# Patient Record
Sex: FEMALE | Race: BLACK OR AFRICAN AMERICAN | NOT HISPANIC OR LATINO | Employment: UNEMPLOYED | URBAN - METROPOLITAN AREA
[De-identification: names, ages, dates, MRNs, and addresses within clinical notes are randomized per-mention and may not be internally consistent; named-entity substitution may affect disease eponyms.]

---

## 2019-04-25 ENCOUNTER — DOCUMENTATION ONLY (OUTPATIENT)
Dept: CARE COORDINATION | Facility: CLINIC | Age: 54
End: 2019-04-25

## 2019-04-26 ENCOUNTER — HOSPITAL ENCOUNTER (OUTPATIENT)
Dept: ULTRASOUND IMAGING | Facility: CLINIC | Age: 54
Discharge: HOME | End: 2019-04-26
Attending: FAMILY MEDICINE | Admitting: FAMILY MEDICINE
Payer: COMMERCIAL

## 2019-04-26 DIAGNOSIS — M79.672 PAIN OF LEFT FOOT: ICD-10-CM

## 2019-04-26 DIAGNOSIS — M79.605 PAIN OF LEFT LOWER EXTREMITY: ICD-10-CM

## 2019-04-26 PROCEDURE — 93971 EXTREMITY STUDY: CPT | Mod: LT

## 2019-05-06 NOTE — TELEPHONE ENCOUNTER
RECORDS RECEIVED FROM: Consult for Left Foot Pain/Swelling, US done 4/26/19, per [facility]   DATE RECEIVED: May 15, 2019    NOTES STATUS DETAILS   OFFICE NOTE from referring provider Received Cassie   OFFICE NOTE from other specialist N/A    DISCHARGE SUMMARY from hospital N/A    DISCHARGE REPORT from the ER N/A    OPERATIVE REPORT N/A    MEDICATION LIST Received    IMPLANT RECORD/STICKER N/A    LABS     CBC/DIFF N/A    CULTURES N/A    INJECTIONS DONE IN RADIOLOGY N/A    MRI N/A    ULTRASOUND Internal 4/26/19   CT SCAN N/A    XRAYS (IMAGES & REPORTS) N/A    TUMOR     PATHOLOGY  Slides & report N/A

## 2019-05-15 ENCOUNTER — PRE VISIT (OUTPATIENT)
Dept: ORTHOPEDICS | Facility: CLINIC | Age: 54
End: 2019-05-15

## 2019-05-15 ENCOUNTER — OFFICE VISIT (OUTPATIENT)
Dept: ORTHOPEDICS | Facility: CLINIC | Age: 54
End: 2019-05-15
Payer: COMMERCIAL

## 2019-05-15 DIAGNOSIS — G62.9 PERIPHERAL POLYNEUROPATHY: Primary | ICD-10-CM

## 2019-05-15 DIAGNOSIS — M79.605 LEFT LEG PAIN: ICD-10-CM

## 2019-05-15 DIAGNOSIS — G62.9 PERIPHERAL POLYNEUROPATHY: ICD-10-CM

## 2019-05-15 LAB
HBA1C MFR BLD: 6.2 % (ref 0–5.6)
HIV 1+2 AB+HIV1 P24 AG SERPL QL IA: NONREACTIVE

## 2019-05-15 NOTE — NURSING NOTE
Reason For Visit:   Chief Complaint   Patient presents with     Consult     Pain, left foot. Ultra sound, 4-.      Medication Problem     Patient stated that she has a lot of medicine she takes but does not know the names.        Pain Assessment  Patient Currently in Pain: Yes  Primary Pain Location: Foot(Left)        Allergies   Allergen Reactions     Penicillins Other (See Comments) and Rash           Daja Hood LPN

## 2019-05-15 NOTE — LETTER
"5/15/2019       RE: Nenita Varela  [address]  [facility] [address]     Dear Colleague,    Thank you for referring your patient, Nenita Varela, to the [facility]. Please see a copy of my visit note below.    Chief Complaint:   Chief Complaint   Patient presents with     Consult     Pain, left foot. Ultra sound, 4-.        Subjective: Nenita is a 54 year old female who presents to the clinic today for evaluation of left leg pain. Presents with  today. Patient is a poor historian.    Onset, duration: About 1 year ago. [place] about [place] ago, says that she had a head injury at some point and after coming to the [place] she began to have many systemic symptoms like nausea, dizziness, headaches. This became a throat pain and then migrated to her entire body feeling pain. She is wondering if it is maybe the climate.   Location: Today wants to discuss left leg pain. Occurs to the entire LLE, hip to toes. Cannot sleep on left side because of this.   Assoc. symptoms: Low back pain, pains to entire body. Feet feel like they are burning in the winter.   Quality, severity: Cramping, stiffness, sharp. Burning pain at times. Pain is the worst at night. Pain occurs daily.  Provocative: Walking any length of distance (more than just around the house or store). Sleeping on the left side.   Palliative: Rest, nothing.  Previous Treatments:  \"none\" - though she is supposedly taking gabapentin TID for low back pain with possible radiculopathy according to PCP notes.    ROS: Denies shooting and electrical pains. Denies numbness to feet. Denies swelling, redness, open lesions, rashes. Admits feeling like her joints are dislocated.     No past medical history on file.  No past surgical history on file.  No family history on file.  Social History     Tobacco Use     Smoking status: Not on file   Substance Use Topics     Alcohol use: " Not on file     Allergies   Allergen Reactions     Penicillins Other (See Comments) and Rash     Current Outpatient Rx   Medication Sig Dispense Refill     ibuprofen (ADVIL,MOTRIN) 600 MG tablet Take 1 tablet (600 mg) by mouth every 6 hours as needed for moderate pain 20 tablet 0       Objective:   There were no vitals taken for this visit.    General: Patient is well groomed, appears stated age, is alert, cooperative and in no acute distress.  Vascular: DP and PT pulses are 2/4 bilaterally. LE capillary refill time is <3 seconds. Normal pedal hair. No LE edema.  MSK: No pain with active or passive ROM of the ankle, MTJ, 1st ray or halluces bilaterally. Equinus absent bilaterally. Nonspecific tenderness to palpation of gastroc/soleus.  Neurologic: Pinpoint sensation decreased to feet bilaterally with 10 gram monofilament.  Skin: LE skin color, texture and turgor are normal. Toenails are normal bilaterally. No open lesions or rashes.     Previous Laboratory Studies:   Lab Results   Component Value Date    A1C 6.2 05/15/2019       US Doppler LE 4/26/19  IMPRESSION:.  No deep vein thrombosis in the left lower extremity.     I have personally reviewed the examination and initial interpretation  and I agree with the findings.     KEENAN PARDO MD    Assessment:   - Left leg pain: most likely diagnosis at this time includes peripheral neuropathy as she did not feel all points with monofilament bilaterally. I believe the pain she is feeling is multifactorial, including low back disease and possibly also her emotional trauma before and during her immigration to the [place]. During the encounter, she continued to mention her immigration and how since then she has had pain throughout her entire body.     Plan:   - Pt seen and evaluated. Diagnosis and treatment options were discussed.   - Recommend further evaluation of pain in combination with absent to decreased sensation via monofilament exam with neurology. Referral placed.  Note that she is prescribed gabapentin 300 mg TID for low back pain with possible radiculopathy.  - Ordered blood work to evaluate for causes of neuropathy like diabetes, HIV and vitamin B12 deficiency. Her TSH was <0.4 on 4/9 and she was prescribed levothyroxine 88 mcg QD on 4/24.  - She went to 2 PT sessions and did not tolerate it so stopped going. Recommend trying again with massage, strengthening.  - Pt to return to clinic as needed    Answers for HPI/ROS submitted by the patient on 5/15/2019   General Symptoms: No  Skin Symptoms: No  HENT Symptoms: No  EYE SYMPTOMS: No  HEART SYMPTOMS: No  LUNG SYMPTOMS: No  INTESTINAL SYMPTOMS: No  URINARY SYMPTOMS: No  GYNECOLOGIC SYMPTOMS: No  BREAST SYMPTOMS: No  SKELETAL SYMPTOMS: No  BLOOD SYMPTOMS: No  NERVOUS SYSTEM SYMPTOMS: No  MENTAL HEALTH SYMPTOMS: No      Again, thank you for allowing me to participate in the care of your patient.      Sincerely,    Deborah Noel PA-C

## 2019-05-15 NOTE — PROGRESS NOTES
"Chief Complaint:   Chief Complaint   Patient presents with     Consult     Pain, left foot. Ultra sound, 4-.        Subjective: Nenita is a 54 year old female who presents to the clinic today for evaluation of left leg pain. Presents with  today. Patient is a poor historian.    Onset, duration: About 1 year ago. [place] about [place] ago, says that she had a head injury at some point and after coming to the [place] she began to have many systemic symptoms like nausea, dizziness, headaches. This became a throat pain and then migrated to her entire body feeling pain. She is wondering if it is maybe the climate.   Location: Today wants to discuss left leg pain. Occurs to the entire LLE, hip to toes. Cannot sleep on left side because of this.   Assoc. symptoms: Low back pain, pains to entire body. Feet feel like they are burning in the winter.   Quality, severity: Cramping, stiffness, sharp. Burning pain at times. Pain is the worst at night. Pain occurs daily.  Provocative: Walking any length of distance (more than just around the house or store). Sleeping on the left side.   Palliative: Rest, nothing.  Previous Treatments:  \"none\" - though she is supposedly taking gabapentin TID for low back pain with possible radiculopathy according to PCP notes.    ROS: Denies shooting and electrical pains. Denies numbness to feet. Denies swelling, redness, open lesions, rashes. Admits feeling like her joints are dislocated.     No past medical history on file.  No past surgical history on file.  No family history on file.  Social History     Tobacco Use     Smoking status: Not on file   Substance Use Topics     Alcohol use: Not on file     Allergies   Allergen Reactions     Penicillins Other (See Comments) and Rash     Current Outpatient Rx   Medication Sig Dispense Refill     ibuprofen (ADVIL,MOTRIN) 600 MG tablet Take 1 tablet (600 mg) by mouth every 6 hours as needed for moderate pain 20 tablet 0 "       Objective:   There were no vitals taken for this visit.    General: Patient is well groomed, appears stated age, is alert, cooperative and in no acute distress.  Vascular: DP and PT pulses are 2/4 bilaterally. LE capillary refill time is <3 seconds. Normal pedal hair. No LE edema.  MSK: No pain with active or passive ROM of the ankle, MTJ, 1st ray or halluces bilaterally. Equinus absent bilaterally. Nonspecific tenderness to palpation of gastroc/soleus.  Neurologic: Pinpoint sensation decreased to feet bilaterally with 10 gram monofilament.  Skin: LE skin color, texture and turgor are normal. Toenails are normal bilaterally. No open lesions or rashes.     Previous Laboratory Studies:   Lab Results   Component Value Date    A1C 6.2 05/15/2019       US Doppler LE 4/26/19  IMPRESSION:.  No deep vein thrombosis in the left lower extremity.     I have personally reviewed the examination and initial interpretation  and I agree with the findings.     KEENAN PARDO MD    Assessment:   - Left leg pain: most likely diagnosis at this time includes peripheral neuropathy as she did not feel all points with monofilament bilaterally. I believe the pain she is feeling is multifactorial, including low back disease and possibly also her emotional trauma before and during her immigration to the [place]. During the encounter, she continued to mention her immigration and how since then she has had pain throughout her entire body.     Plan:   - Pt seen and evaluated. Diagnosis and treatment options were discussed.   - Recommend further evaluation of pain in combination with absent to decreased sensation via monofilament exam with neurology. Referral placed. Note that she is prescribed gabapentin 300 mg TID for low back pain with possible radiculopathy.  - Ordered blood work to evaluate for causes of neuropathy like diabetes, HIV and vitamin B12 deficiency. Her TSH was <0.4 on 4/9 and she was prescribed levothyroxine 88 mcg QD on  4/24.  - She went to 2 PT sessions and did not tolerate it so stopped going. Recommend trying again with massage, strengthening.  - Pt to return to clinic as needed    Answers for HPI/ROS submitted by the patient on 5/15/2019   General Symptoms: No  Skin Symptoms: No  HENT Symptoms: No  EYE SYMPTOMS: No  HEART SYMPTOMS: No  LUNG SYMPTOMS: No  INTESTINAL SYMPTOMS: No  URINARY SYMPTOMS: No  GYNECOLOGIC SYMPTOMS: No  BREAST SYMPTOMS: No  SKELETAL SYMPTOMS: No  BLOOD SYMPTOMS: No  NERVOUS SYSTEM SYMPTOMS: No  MENTAL HEALTH SYMPTOMS: No

## 2019-05-16 LAB — VIT B12 SERPL-MCNC: 438 PG/ML (ref 193–986)

## 2019-08-22 ENCOUNTER — HOSPITAL ENCOUNTER (EMERGENCY)
Facility: CLINIC | Age: 54
End: 2019-08-22
Payer: COMMERCIAL

## 2019-08-22 ENCOUNTER — HOSPITAL ENCOUNTER (OUTPATIENT)
Dept: CARDIOLOGY | Facility: CLINIC | Age: 54
Discharge: HOME | End: 2019-08-22
Attending: INTERNAL MEDICINE | Admitting: INTERNAL MEDICINE
Payer: COMMERCIAL

## 2019-08-22 DIAGNOSIS — R06.09 DOE (DYSPNEA ON EXERTION): ICD-10-CM

## 2019-08-22 DIAGNOSIS — R06.02 SHORTNESS OF BREATH: ICD-10-CM

## 2019-08-22 DIAGNOSIS — R07.9 CHEST PAIN: ICD-10-CM

## 2019-08-22 PROCEDURE — 25500064 ZZH RX 255 OP 636: Performed by: INTERNAL MEDICINE

## 2019-08-22 PROCEDURE — 93350 STRESS TTE ONLY: CPT | Mod: 26 | Performed by: INTERNAL MEDICINE

## 2019-08-22 PROCEDURE — 93325 DOPPLER ECHO COLOR FLOW MAPG: CPT | Mod: 26 | Performed by: INTERNAL MEDICINE

## 2019-08-22 PROCEDURE — 25000125 ZZHC RX 250: Performed by: INTERNAL MEDICINE

## 2019-08-22 PROCEDURE — 25000128 H RX IP 250 OP 636: Performed by: INTERNAL MEDICINE

## 2019-08-22 PROCEDURE — 93321 DOPPLER ECHO F-UP/LMTD STD: CPT | Mod: 26 | Performed by: INTERNAL MEDICINE

## 2019-08-22 PROCEDURE — 40000264 ECHO STRESS ECHOCARDIOGRAM

## 2019-08-22 PROCEDURE — 93018 CV STRESS TEST I&R ONLY: CPT | Performed by: INTERNAL MEDICINE

## 2019-08-22 PROCEDURE — T1013 SIGN LANG/ORAL INTERPRETER: HCPCS | Mod: U3

## 2019-08-22 PROCEDURE — 93016 CV STRESS TEST SUPVJ ONLY: CPT | Performed by: INTERNAL MEDICINE

## 2019-08-22 RX ORDER — DOBUTAMINE HYDROCHLORIDE 200 MG/100ML
10-50 INJECTION INTRAVENOUS CONTINUOUS
Status: SHIPPED | OUTPATIENT
Start: 2019-08-22 | End: 2019-08-22

## 2019-08-22 RX ORDER — METOPROLOL TARTRATE 1 MG/ML
1-20 INJECTION, SOLUTION INTRAVENOUS
Status: ACTIVE | OUTPATIENT
Start: 2019-08-22 | End: 2019-08-22

## 2019-08-22 RX ORDER — ATROPINE SULFATE 0.4 MG/ML
.2-2 AMPUL (ML) INJECTION
Status: COMPLETED | OUTPATIENT
Start: 2019-08-22 | End: 2019-08-22

## 2019-08-22 RX ADMIN — DOBUTAMINE HYDROCHLORIDE 10 MCG/KG/MIN: 200 INJECTION INTRAVENOUS at 10:37

## 2019-08-22 RX ADMIN — METOPROLOL TARTRATE 2 MG: 1 INJECTION, SOLUTION INTRAVENOUS at 10:51

## 2019-08-22 RX ADMIN — ATROPINE SULFATE 0.2 MG: 0.4 INJECTION, SOLUTION INTRAMUSCULAR; INTRAVENOUS; SUBCUTANEOUS at 10:45

## 2019-08-22 RX ADMIN — PERFLUTREN 5 ML: 6.52 INJECTION, SUSPENSION INTRAVENOUS at 10:50

## 2019-08-22 NOTE — PROGRESS NOTES
Pt here for dobutamine stress test.  Test, meds and side effects reviewed with patient.  Achieved target HR at 30 mcg Dobutamine and a total of 0.2 mg IV atropine.  Gave a total of 2 mg IV Metoprolol to bring HR back to baseline.  Pt did complain of 5/10 chest pain when we reached target HR. Medication was stopped. Chest pain resolved. MD stated ok to go home. Post monitoring complete and VSS.  Pt escorted out to the gold waiting room.

## 2020-04-14 ENCOUNTER — HOSPITAL ENCOUNTER (EMERGENCY)
Facility: CLINIC | Age: 55
Discharge: HOME | End: 2020-04-14
Attending: PHYSICIAN ASSISTANT | Admitting: PHYSICIAN ASSISTANT
Payer: COMMERCIAL

## 2020-04-14 ENCOUNTER — APPOINTMENT (OUTPATIENT)
Dept: CT IMAGING | Facility: CLINIC | Age: 55
End: 2020-04-14
Attending: PHYSICIAN ASSISTANT
Payer: COMMERCIAL

## 2020-04-14 ENCOUNTER — APPOINTMENT (OUTPATIENT)
Dept: GENERAL RADIOLOGY | Facility: CLINIC | Age: 55
End: 2020-04-14
Attending: PHYSICIAN ASSISTANT
Payer: COMMERCIAL

## 2020-04-14 VITALS
DIASTOLIC BLOOD PRESSURE: 78 MMHG | OXYGEN SATURATION: 97 % | SYSTOLIC BLOOD PRESSURE: 153 MMHG | TEMPERATURE: 98 F | RESPIRATION RATE: 16 BRPM | HEART RATE: 91 BPM

## 2020-04-14 DIAGNOSIS — K85.90 ACUTE PANCREATITIS, UNSPECIFIED COMPLICATION STATUS, UNSPECIFIED PANCREATITIS TYPE: ICD-10-CM

## 2020-04-14 DIAGNOSIS — R10.13 ABDOMINAL PAIN, EPIGASTRIC: ICD-10-CM

## 2020-04-14 DIAGNOSIS — R11.0 NAUSEA: ICD-10-CM

## 2020-04-14 DIAGNOSIS — R06.02 SHORTNESS OF BREATH: ICD-10-CM

## 2020-04-14 LAB
ALBUMIN SERPL-MCNC: 3.8 G/DL (ref 3.4–5)
ALBUMIN UR-MCNC: NEGATIVE MG/DL
ALP SERPL-CCNC: 110 U/L (ref 40–150)
ALT SERPL W P-5'-P-CCNC: 26 U/L (ref 0–50)
ANION GAP SERPL CALCULATED.3IONS-SCNC: 4 MMOL/L (ref 3–14)
APPEARANCE UR: CLEAR
AST SERPL W P-5'-P-CCNC: 17 U/L (ref 0–45)
BASOPHILS # BLD AUTO: 0 10E9/L (ref 0–0.2)
BASOPHILS NFR BLD AUTO: 0.3 %
BILIRUB SERPL-MCNC: 0.3 MG/DL (ref 0.2–1.3)
BILIRUB UR QL STRIP: NEGATIVE
BUN SERPL-MCNC: 14 MG/DL (ref 7–30)
CALCIUM SERPL-MCNC: 8.8 MG/DL (ref 8.5–10.1)
CHLORIDE SERPL-SCNC: 98 MMOL/L (ref 94–109)
CO2 SERPL-SCNC: 27 MMOL/L (ref 20–32)
COLOR UR AUTO: ABNORMAL
CREAT SERPL-MCNC: 0.64 MG/DL (ref 0.52–1.04)
DIFFERENTIAL METHOD BLD: NORMAL
EOSINOPHIL # BLD AUTO: 0 10E9/L (ref 0–0.7)
EOSINOPHIL NFR BLD AUTO: 0.6 %
ERYTHROCYTE [DISTWIDTH] IN BLOOD BY AUTOMATED COUNT: 11.9 % (ref 10–15)
GFR SERPL CREATININE-BSD FRML MDRD: >90 ML/MIN/{1.73_M2}
GLUCOSE SERPL-MCNC: 231 MG/DL (ref 70–99)
GLUCOSE UR STRIP-MCNC: 70 MG/DL
HCG SERPL QL: NEGATIVE
HCT VFR BLD AUTO: 42.1 % (ref 35–47)
HGB BLD-MCNC: 14.1 G/DL (ref 11.7–15.7)
HGB UR QL STRIP: NEGATIVE
IMM GRANULOCYTES # BLD: 0 10E9/L (ref 0–0.4)
IMM GRANULOCYTES NFR BLD: 0.4 %
KETONES UR STRIP-MCNC: NEGATIVE MG/DL
LEUKOCYTE ESTERASE UR QL STRIP: NEGATIVE
LIPASE SERPL-CCNC: 604 U/L (ref 73–393)
LYMPHOCYTES # BLD AUTO: 1.6 10E9/L (ref 0.8–5.3)
LYMPHOCYTES NFR BLD AUTO: 23.4 %
MCH RBC QN AUTO: 28.7 PG (ref 26.5–33)
MCHC RBC AUTO-ENTMCNC: 33.5 G/DL (ref 31.5–36.5)
MCV RBC AUTO: 86 FL (ref 78–100)
MONOCYTES # BLD AUTO: 0.5 10E9/L (ref 0–1.3)
MONOCYTES NFR BLD AUTO: 6.5 %
NEUTROPHILS # BLD AUTO: 4.8 10E9/L (ref 1.6–8.3)
NEUTROPHILS NFR BLD AUTO: 68.8 %
NITRATE UR QL: NEGATIVE
NRBC # BLD AUTO: 0 10*3/UL
NRBC BLD AUTO-RTO: 0 /100
PH UR STRIP: 7 PH (ref 5–7)
PLATELET # BLD AUTO: 231 10E9/L (ref 150–450)
POTASSIUM SERPL-SCNC: 4 MMOL/L (ref 3.4–5.3)
PROT SERPL-MCNC: 7.8 G/DL (ref 6.8–8.8)
RBC # BLD AUTO: 4.91 10E12/L (ref 3.8–5.2)
RBC #/AREA URNS AUTO: 0 /HPF (ref 0–2)
SODIUM SERPL-SCNC: 129 MMOL/L (ref 133–144)
SOURCE: ABNORMAL
SP GR UR STRIP: 1 (ref 1–1.03)
TROPONIN I SERPL-MCNC: <0.015 UG/L (ref 0–0.04)
UROBILINOGEN UR STRIP-MCNC: NORMAL MG/DL (ref 0–2)
WBC # BLD AUTO: 6.9 10E9/L (ref 4–11)
WBC #/AREA URNS AUTO: <1 /HPF (ref 0–5)

## 2020-04-14 PROCEDURE — 85025 COMPLETE CBC W/AUTO DIFF WBC: CPT | Performed by: PHYSICIAN ASSISTANT

## 2020-04-14 PROCEDURE — 71046 X-RAY EXAM CHEST 2 VIEWS: CPT

## 2020-04-14 PROCEDURE — 96361 HYDRATE IV INFUSION ADD-ON: CPT

## 2020-04-14 PROCEDURE — 25000128 H RX IP 250 OP 636: Performed by: PHYSICIAN ASSISTANT

## 2020-04-14 PROCEDURE — 93005 ELECTROCARDIOGRAM TRACING: CPT

## 2020-04-14 PROCEDURE — 84703 CHORIONIC GONADOTROPIN ASSAY: CPT | Performed by: PHYSICIAN ASSISTANT

## 2020-04-14 PROCEDURE — 83690 ASSAY OF LIPASE: CPT | Performed by: PHYSICIAN ASSISTANT

## 2020-04-14 PROCEDURE — 84484 ASSAY OF TROPONIN QUANT: CPT | Performed by: PHYSICIAN ASSISTANT

## 2020-04-14 PROCEDURE — 25000132 ZZH RX MED GY IP 250 OP 250 PS 637: Performed by: PHYSICIAN ASSISTANT

## 2020-04-14 PROCEDURE — 99285 EMERGENCY DEPT VISIT HI MDM: CPT | Mod: 25

## 2020-04-14 PROCEDURE — 81001 URINALYSIS AUTO W/SCOPE: CPT | Performed by: PHYSICIAN ASSISTANT

## 2020-04-14 PROCEDURE — 25800030 ZZH RX IP 258 OP 636: Performed by: PHYSICIAN ASSISTANT

## 2020-04-14 PROCEDURE — 80053 COMPREHEN METABOLIC PANEL: CPT | Performed by: PHYSICIAN ASSISTANT

## 2020-04-14 PROCEDURE — 25000125 ZZHC RX 250: Performed by: PHYSICIAN ASSISTANT

## 2020-04-14 PROCEDURE — 96374 THER/PROPH/DIAG INJ IV PUSH: CPT | Mod: 59

## 2020-04-14 PROCEDURE — 74177 CT ABD & PELVIS W/CONTRAST: CPT

## 2020-04-14 RX ORDER — ONDANSETRON 2 MG/ML
4 INJECTION INTRAMUSCULAR; INTRAVENOUS EVERY 30 MIN PRN
Status: DISCONTINUED | OUTPATIENT
Start: 2020-04-14 | End: 2020-04-14 | Stop reason: HOSPADM

## 2020-04-14 RX ORDER — SUCRALFATE 1 G/1
1 TABLET ORAL 4 TIMES DAILY
Qty: 16 TABLET | Refills: 0 | Status: SHIPPED | OUTPATIENT
Start: 2020-04-14 | End: 2020-06-12

## 2020-04-14 RX ORDER — IOPAMIDOL 755 MG/ML
100 INJECTION, SOLUTION INTRAVASCULAR ONCE
Status: COMPLETED | OUTPATIENT
Start: 2020-04-14 | End: 2020-04-14

## 2020-04-14 RX ORDER — ONDANSETRON 4 MG/1
4 TABLET, ORALLY DISINTEGRATING ORAL EVERY 8 HOURS PRN
Qty: 10 TABLET | Refills: 0 | Status: SHIPPED | OUTPATIENT
Start: 2020-04-14 | End: 2020-04-17

## 2020-04-14 RX ORDER — OXYCODONE HYDROCHLORIDE 5 MG/1
5 TABLET ORAL EVERY 6 HOURS PRN
Qty: 12 TABLET | Refills: 0 | Status: SHIPPED | OUTPATIENT
Start: 2020-04-14

## 2020-04-14 RX ADMIN — SODIUM CHLORIDE 1000 ML: 9 INJECTION, SOLUTION INTRAVENOUS at 18:04

## 2020-04-14 RX ADMIN — LIDOCAINE HYDROCHLORIDE 30 ML: 20 SOLUTION ORAL; TOPICAL at 18:17

## 2020-04-14 RX ADMIN — IOPAMIDOL 100 ML: 755 INJECTION, SOLUTION INTRAVENOUS at 18:29

## 2020-04-14 RX ADMIN — ONDANSETRON 4 MG: 2 INJECTION INTRAMUSCULAR; INTRAVENOUS at 18:03

## 2020-04-14 ASSESSMENT — ENCOUNTER SYMPTOMS
FEVER: 0
SORE THROAT: 0
DIARRHEA: 0
VOMITING: 0
RHINORRHEA: 0
NAUSEA: 1
ABDOMINAL PAIN: 1
COUGH: 0
BLOOD IN STOOL: 0
SHORTNESS OF BREATH: 1

## 2020-04-14 NOTE — ED PROVIDER NOTES
History     Chief Complaint:  Abdominal Pain    HPI  Jayne Merchant is a 55 year old female the ED for evaluation of abdominal pain.  Patient reports onset of epigastric abdominal pain that began a few days ago. The pain is non-radiating, intermittent, and nothing makes the pain worse or better. The abdominal pain is significantly worse today.  She reports associated midsternal chest pain as well as nausea, mild subjective fever, and mild shortness of breath.    She denies any cough, sore throat, rhinorrhea. She denies any vomiting, diarrhea, or black or bloody stools.     US Abd Complete on 2/3/2020  FINDINGS:  Liver: Normal echotexture is seen throughout the liver. There is no evidence of intrahepatic biliary dilatation.  Gallbladder: There is no evidence of gallstone. Gallbladder wall is normal thickness. Negative sonographic Oliver sign is reported.  Common bile duct: 4 mm.   Pancreas: Visualized portions of the pancreas are normal.  Spleen: Normal.   Right kidney: There is mild prominence of the renal calices. There is no evidence of hydronephrosis or mass.  Left kidney: There is mild prominence of the renal calices. There is no evidence of hydronephrosis or mass.   IMPRESSION:  Normal abdominal ultrasound.    Lipase 25.3 on 1/29/20  Triglycerides 178 on 1/27/20    Allergies:  Statins    Medications:    Prilosec  Singulair  Cymbalta  Nortriptyline  Lyrica  Insulin  Flonase  Zofran    Past Medical History:    Type I diabetes  Fibromyalgia  Lipoma of left upper extremity  Tension headache  HLD  Memory deficit  Degeneration of intervertebral disk   Chronic neck pain  H. Pylori  [place]    Past Surgical History:    C section  Female circumcision    Family History:    History reviewed. No pertinent family history.     Social History:  The patient arrives alone  Smoking: never  Alcohol use: no  Marital Status:  [5]      Review of Systems   Constitutional: Negative for fever.   HENT: Negative for rhinorrhea  and sore throat.    Respiratory: Positive for shortness of breath. Negative for cough.    Cardiovascular: Positive for chest pain.   Gastrointestinal: Positive for abdominal pain and nausea. Negative for blood in stool, diarrhea and vomiting.   All other systems reviewed and are negative.      Physical Exam     Patient Vitals for the past 24 hrs:   BP Temp Temp src Pulse Heart Rate Resp SpO2   04/14/20 1900 -- -- -- -- -- -- 97 %   04/14/20 1800 -- -- -- 91 -- -- 100 %   04/14/20 1734 -- 98  F (36.7  C) Oral -- -- -- --   04/14/20 1733 (!) 153/78 -- -- -- 91 16 100 %     Physical Exam  Constitutional: Pleasant. Cooperative.   Eyes: Pupils equally round and reactive  HENT: Head is normal in appearance. Oropharynx is normal with moist mucus membranes.  Cardiovascular: Regular rate and rhythm and without murmurs.  Respiratory: Normal respiratory effort, lungs are clear bilaterally.  GI: Tenderness around area of umbilicus and epigastrium, otherwise non-tender, soft, non-distended. No guarding, rebound, or rigidity.  Musculoskeletal: No asymmetry of the lower extremities, no tenderness to palpation.  Skin: Normal, without rash.  Neurologic: Cranial nerves grossly intact, normal cognition, no focal deficits. Alert and oriented x 3.   Psychiatric: Normal affect.  Nursing notes and vital signs reviewed.    Emergency Department Course     ECG:  ECG taken at 1849, ECG read at 1851  Normal sinus rhythm  normal ECG  Rate 95 bpm. TN interval 150 ms. QRS duration 84 ms. QT/QTc 364/457 ms. P-R-T axes 74 30 70.    Imaging:  Radiology findings were communicated with the patient who voiced understanding of the findings.    XR Chest 2 views   IMPRESSION: Heart size and pulmonary vascularity normal. The lungs are clear  Reading per radiology    CT Abdomen Pelvis w contrast   IMPRESSION:   1.  Small hepatic cysts and left renal cyst.   2.  Distended bladder.   3.  Exam otherwise unremarkable. No findings to account for the patient's  diffuse abdominal pain  Reading per radiology    Laboratory:  Laboratory findings were communicated with the patient who voiced understanding of the findings.    CBC:  o/w WNL. (WBC 6.9, HGB 14.1, )   CMP: Glucose 231 (H),  (L), o/w WNL (Creatinine: 0.64)    Lipase: 604 (H)    Troponin (Collected 1750): <0.015    UA: urine glucose 70 o/w WNL  Urine culture: Pending    HCG Qualitative Urine: negative    Interventions:  1803 Zofran 4mg IV injection   1804 NS 1L IV Bolus  1817 GI Cocktail - Maalox 15 mL, Viscous Lidocaine 15 mL, 30 mL suspension PO    Emergency Department Course:  Past medical records, nursing notes, and vitals reviewed.  1725: I performed an exam of the patient and obtained history, as documented above.     IV was inserted and blood was drawn for laboratory testing, results above.  The patient was sent for a CT and XR while in the emergency department, findings above  The patient provided a urine sample here in the emergency department. This was sent for laboratory testing, findings above.    1902: I rechecked the patient. Explained findings to patient.    I personally reviewed the laboratory and imaging results with the Patient and answered all related questions prior to discharge.     Findings and plan explained to the Patient. Patient discharged home with instructions regarding supportive care, medications, and reasons to return. The importance of close follow-up was reviewed.   Impression & Plan      Medical Decision Making:  Jayne Merchant is a 55 year old female who presents to the emergency department today for evaluation of chest pain and abdominal pain.  See HPI as above for additional details.  Vitals and physical exam as above.  Differential for abdominal pain was broad and included ACS, gallbladder pathology, GERD, pancreatitis, PUD, among others.  Differential for chest pain included ACS, pneumothorax, GERD, dissection, MSK, among others.  The above work-up was obtained.   Suspect patient's abdominal symptoms are secondary to her mild pancreatitis vs GERD.  This is of unclear etiology at this time.  Patient had a recent ultrasound of her abdomen which revealed no gallstones, see  results as above.  Patient also does not have history of significantly elevated triglycerides as above.  Patient does not consume alcohol.  Etiology of patient's pancreatitis is unclear at this time.  Given mild nature and patient's complete resolution of symptoms following GI cocktail, will discharge patient to home with pain medication, nausea medication, and referral for GI. Chest pain is of unclear etiology at this time.  No evidence for the above etiology with the exception of possibly GERD.  Patient did receive complete resolution of symptoms following GI cocktail. Will have patient do clear liquid diet for the next few days and use prescriptions for her symptoms. She should call her PCP to discuss this visit. Discussed reasons to return. All questions answered. Patient discharged to home in stable condition.    Diagnosis:    ICD-10-CM    1. Abdominal pain, epigastric  R10.13    2. Acute pancreatitis, unspecified complication status, unspecified pancreatitis type  K85.90    3. Nausea  R11.0    4. Shortness of breath  R06.02        Disposition:   discharged to home    Discharge Medications:     Medication List      Started    ondansetron 4 MG ODT tab  Commonly known as:  Zofran ODT  4 mg, Oral, EVERY 8 HOURS PRN     oxyCODONE 5 MG tablet  Commonly known as:  ROXICODONE  5 mg, Oral, EVERY 6 HOURS PRN     sucralfate 1 GM tablet  Commonly known as:  CARAFATE  1 g, Oral, 4 TIMES DAILY            Scribe Disclosure:  Lexie LORENZANA, am serving as a scribe at 5:25 PM on 4/14/2020 to document services personally performed by Ludwig Roque PA-C based on my observations and the provider's statements to me.    [facility] EMERGENCY DEPARTMENT       Ludwig Roque PA-C  04/14/20 1953

## 2020-04-14 NOTE — ED AVS SNAPSHOT
[facility] Emergency Department  [address]  [facility] [address]  Phone:  [phone]  Fax:  [phone]                                    Jayne Merchant   MRN: [id number]    Department:  [facility] Emergency Department   Date of Visit:  4/14/2020           After Visit Summary Signature Page    I have received my discharge instructions, and my questions have been answered. I have discussed any challenges I see with this plan with the nurse or doctor.    ..........................................................................................................................................  Patient/Patient Representative Signature      ..........................................................................................................................................  Patient Representative Print Name and Relationship to Patient    ..................................................               ................................................  Date                                   Time    ..........................................................................................................................................  Reviewed by Signature/Title    ...................................................              ..............................................  Date                                               Time          22EPIC Rev 08/18

## 2020-04-14 NOTE — ED TRIAGE NOTES
"Pt arrives with nausea and stomach. Pt describes pain as \"gastic pain\" that moves into chest. Pt has history of similar symptoms. A&O, VSS.   "

## 2020-04-15 LAB — INTERPRETATION ECG - MUSE: NORMAL

## 2020-04-15 NOTE — DISCHARGE INSTRUCTIONS
For pain, you can take up to 1000 mg or 1 g of Tylenol.  You can take 600 mg of ibuprofen at one time.  You can take these together every 6 hours if needed or alternate every 3 hours.  Always take ibuprofen with food.  Never take more than 4 g (4000 mg) of Tylenol or 3200mg of ibuprofen in one day.  Do not take this amount for more than 1 week at a time.     Drink plenty of fluids.    Consume a clear liquid diet for the next 2-3 days.    Use Oxycodone for breakthrough pain. This medication is sedating. Do not drive after taking it.    Discharge Instructions  COVID-19    Your Provider has determined that you should practice self-isolation and self-monitoring in order to protect yourself and your community from COVID-19, which is the disease caused by a new coronavirus. The virus spreads from person to person primarily by droplets when an infected person coughs or sneezes and the droplet either lands on another person or that other person touches a surface with the droplet on it. Diagnosis of COVID-19 can be made with a test but many times the test is unavailable or not necessary. There is no specific treatment or medicine for the disease.    Symptoms of COVID-19  Many people have no symptoms or mild symptoms.  Symptoms may usually appear 4 to 5 days (up to 14 days) after contact with another ill person. Some people will get severe symptoms and pneumonia. Usual symptoms are:     Fever    Cough   Trouble breathing   Less common symptoms are: Headache, body aches, sore    throat, sneezing, diarrhea.    How to Care for Yourself    Stay home.  Most people will recover from illness with mild symptoms.  Isolation by staying home is the best method to prevent the spread of the illness. Do not go to work or school. Have a friend or relative do your shopping. Do not use public transportation (bus, train) or ridesharing (Lyft, Uber).    How long should I stay home?  If you have symptoms of a respiratory disease (fever, cough),  you should stay home for at least 7 days, and for 3 days with no fever and improvement of respiratory symptoms--whichever is longer. (Your fever should be gone for 3 days without using fever-reducing medicine.)    For example, if you have a fever and coughing for 4 days, you need to stay home 3 more days with no fever for a total of 7 days. Or, if you have a fever and coughing for 5 days, you need to stay home 3 more days with no fever for a total of 8 days.    Separate yourself from other people in your home.?As much as possible, you should stay in one room and away from other people in your home. Also, use a separate bathroom, if possible. Avoid handling pets or other animals while sick.     Wear a facemask if you need to be around other people and cover your mouth and nose with a tissue when you cough or sneeze.     Avoid sharing personal household items. You should not share dishes, drinking glasses, forks/knives/spoons, towels, or bedding with other people in your home. After using these items, they should be washed with soap and water. Clean parts of your home that are touched often (doorknobs, faucets, countertops, etc.) daily.     Wash your hands often with soap and water for at least 20 seconds or use an alcohol-based hand  containing at least 60% alcohol.     Avoid touching your face.    Treat your symptoms: Take Acetaminophen (Tylenol) to treat body aches and fever as needed for comfort. Ibuprofen (Advil or Motrin) can be used as well if you still have symptoms after taking Tylenol.  Drink fluids. Rest.    Watch for worsening symptoms, shortness of breath, or difficulty   breathing.    Return to the Emergency Department if:    If you are developing worsening breathing, shortness of breath, or feel worse you should seek medical attention.  If you are uncertain, contact your health care provider/clinic. If you need emergency medical attention, call 911 and tell them you have been ill.

## 2020-06-12 ENCOUNTER — HOSPITAL ENCOUNTER (EMERGENCY)
Facility: CLINIC | Age: 55
Discharge: HOME | End: 2020-06-12
Attending: PHYSICIAN ASSISTANT | Admitting: PHYSICIAN ASSISTANT
Payer: COMMERCIAL

## 2020-06-12 ENCOUNTER — APPOINTMENT (OUTPATIENT)
Dept: ULTRASOUND IMAGING | Facility: CLINIC | Age: 55
End: 2020-06-12
Attending: PHYSICIAN ASSISTANT
Payer: COMMERCIAL

## 2020-06-12 ENCOUNTER — APPOINTMENT (OUTPATIENT)
Dept: GENERAL RADIOLOGY | Facility: CLINIC | Age: 55
End: 2020-06-12
Attending: PHYSICIAN ASSISTANT
Payer: COMMERCIAL

## 2020-06-12 VITALS
HEART RATE: 90 BPM | TEMPERATURE: 97.8 F | DIASTOLIC BLOOD PRESSURE: 71 MMHG | WEIGHT: 153.88 LBS | OXYGEN SATURATION: 100 % | RESPIRATION RATE: 18 BRPM | SYSTOLIC BLOOD PRESSURE: 138 MMHG

## 2020-06-12 DIAGNOSIS — R10.13 ABDOMINAL PAIN, EPIGASTRIC: ICD-10-CM

## 2020-06-12 DIAGNOSIS — R11.0 NAUSEA: ICD-10-CM

## 2020-06-12 DIAGNOSIS — R51.9 HEADACHE: ICD-10-CM

## 2020-06-12 LAB
ALBUMIN SERPL-MCNC: 3.7 G/DL (ref 3.4–5)
ALBUMIN UR-MCNC: NEGATIVE MG/DL
ALP SERPL-CCNC: 98 U/L (ref 40–150)
ALT SERPL W P-5'-P-CCNC: 70 U/L (ref 0–50)
ANION GAP SERPL CALCULATED.3IONS-SCNC: 7 MMOL/L (ref 3–14)
APPEARANCE UR: CLEAR
AST SERPL W P-5'-P-CCNC: 25 U/L (ref 0–45)
BASOPHILS # BLD AUTO: 0 10E9/L (ref 0–0.2)
BASOPHILS NFR BLD AUTO: 0.1 %
BILIRUB SERPL-MCNC: 0.2 MG/DL (ref 0.2–1.3)
BILIRUB UR QL STRIP: NEGATIVE
BUN SERPL-MCNC: 19 MG/DL (ref 7–30)
CALCIUM SERPL-MCNC: 8.9 MG/DL (ref 8.5–10.1)
CHLORIDE SERPL-SCNC: 103 MMOL/L (ref 94–109)
CO2 SERPL-SCNC: 26 MMOL/L (ref 20–32)
COLOR UR AUTO: NORMAL
CREAT SERPL-MCNC: 0.74 MG/DL (ref 0.52–1.04)
DIFFERENTIAL METHOD BLD: NORMAL
EOSINOPHIL # BLD AUTO: 0.1 10E9/L (ref 0–0.7)
EOSINOPHIL NFR BLD AUTO: 0.7 %
ERYTHROCYTE [DISTWIDTH] IN BLOOD BY AUTOMATED COUNT: 13.5 % (ref 10–15)
GFR SERPL CREATININE-BSD FRML MDRD: >90 ML/MIN/{1.73_M2}
GLUCOSE BLDC GLUCOMTR-MCNC: 107 MG/DL (ref 70–99)
GLUCOSE BLDC GLUCOMTR-MCNC: 44 MG/DL (ref 70–99)
GLUCOSE BLDC GLUCOMTR-MCNC: 94 MG/DL (ref 70–99)
GLUCOSE SERPL-MCNC: 131 MG/DL (ref 70–99)
GLUCOSE UR STRIP-MCNC: NEGATIVE MG/DL
HCT VFR BLD AUTO: 35.9 % (ref 35–47)
HGB BLD-MCNC: 12.1 G/DL (ref 11.7–15.7)
HGB UR QL STRIP: NEGATIVE
IMM GRANULOCYTES # BLD: 0.1 10E9/L (ref 0–0.4)
IMM GRANULOCYTES NFR BLD: 0.7 %
INTERPRETATION ECG - MUSE: NORMAL
KETONES UR STRIP-MCNC: NEGATIVE MG/DL
LEUKOCYTE ESTERASE UR QL STRIP: NEGATIVE
LIPASE SERPL-CCNC: 133 U/L (ref 73–393)
LYMPHOCYTES # BLD AUTO: 1.6 10E9/L (ref 0.8–5.3)
LYMPHOCYTES NFR BLD AUTO: 21.2 %
MCH RBC QN AUTO: 29.6 PG (ref 26.5–33)
MCHC RBC AUTO-ENTMCNC: 33.7 G/DL (ref 31.5–36.5)
MCV RBC AUTO: 88 FL (ref 78–100)
MONOCYTES # BLD AUTO: 0.6 10E9/L (ref 0–1.3)
MONOCYTES NFR BLD AUTO: 7.6 %
NEUTROPHILS # BLD AUTO: 5.1 10E9/L (ref 1.6–8.3)
NEUTROPHILS NFR BLD AUTO: 69.7 %
NITRATE UR QL: NEGATIVE
NRBC # BLD AUTO: 0 10*3/UL
NRBC BLD AUTO-RTO: 0 /100
PH UR STRIP: 5.5 PH (ref 5–7)
PLATELET # BLD AUTO: 269 10E9/L (ref 150–450)
POTASSIUM SERPL-SCNC: 4.1 MMOL/L (ref 3.4–5.3)
PROT SERPL-MCNC: 7.8 G/DL (ref 6.8–8.8)
RBC # BLD AUTO: 4.09 10E12/L (ref 3.8–5.2)
RBC #/AREA URNS AUTO: 1 /HPF (ref 0–2)
SODIUM SERPL-SCNC: 136 MMOL/L (ref 133–144)
SOURCE: NORMAL
SP GR UR STRIP: 1 (ref 1–1.03)
TROPONIN I SERPL-MCNC: <0.015 UG/L (ref 0–0.04)
UROBILINOGEN UR STRIP-MCNC: NORMAL MG/DL (ref 0–2)
WBC # BLD AUTO: 7.4 10E9/L (ref 4–11)
WBC #/AREA URNS AUTO: <1 /HPF (ref 0–5)

## 2020-06-12 PROCEDURE — 00000146 ZZHCL STATISTIC GLUCOSE BY METER IP

## 2020-06-12 PROCEDURE — 81001 URINALYSIS AUTO W/SCOPE: CPT | Performed by: PHYSICIAN ASSISTANT

## 2020-06-12 PROCEDURE — 85025 COMPLETE CBC W/AUTO DIFF WBC: CPT | Performed by: PHYSICIAN ASSISTANT

## 2020-06-12 PROCEDURE — 25000128 H RX IP 250 OP 636: Performed by: PHYSICIAN ASSISTANT

## 2020-06-12 PROCEDURE — 25000125 ZZHC RX 250: Performed by: PHYSICIAN ASSISTANT

## 2020-06-12 PROCEDURE — 83690 ASSAY OF LIPASE: CPT | Performed by: PHYSICIAN ASSISTANT

## 2020-06-12 PROCEDURE — 25800030 ZZH RX IP 258 OP 636: Performed by: PHYSICIAN ASSISTANT

## 2020-06-12 PROCEDURE — 80053 COMPREHEN METABOLIC PANEL: CPT | Performed by: PHYSICIAN ASSISTANT

## 2020-06-12 PROCEDURE — 96361 HYDRATE IV INFUSION ADD-ON: CPT

## 2020-06-12 PROCEDURE — 76705 ECHO EXAM OF ABDOMEN: CPT

## 2020-06-12 PROCEDURE — 71045 X-RAY EXAM CHEST 1 VIEW: CPT

## 2020-06-12 PROCEDURE — 96375 TX/PRO/DX INJ NEW DRUG ADDON: CPT

## 2020-06-12 PROCEDURE — 93005 ELECTROCARDIOGRAM TRACING: CPT

## 2020-06-12 PROCEDURE — 84484 ASSAY OF TROPONIN QUANT: CPT | Performed by: PHYSICIAN ASSISTANT

## 2020-06-12 PROCEDURE — 96374 THER/PROPH/DIAG INJ IV PUSH: CPT

## 2020-06-12 PROCEDURE — 25000132 ZZH RX MED GY IP 250 OP 250 PS 637: Performed by: PHYSICIAN ASSISTANT

## 2020-06-12 PROCEDURE — 99285 EMERGENCY DEPT VISIT HI MDM: CPT | Mod: 25

## 2020-06-12 RX ORDER — ONDANSETRON 2 MG/ML
4 INJECTION INTRAMUSCULAR; INTRAVENOUS ONCE
Status: COMPLETED | OUTPATIENT
Start: 2020-06-12 | End: 2020-06-12

## 2020-06-12 RX ORDER — KETOROLAC TROMETHAMINE 15 MG/ML
15 INJECTION, SOLUTION INTRAMUSCULAR; INTRAVENOUS ONCE
Status: COMPLETED | OUTPATIENT
Start: 2020-06-12 | End: 2020-06-12

## 2020-06-12 RX ORDER — SUCRALFATE ORAL 1 G/10ML
1 SUSPENSION ORAL 4 TIMES DAILY
Qty: 420 ML | Refills: 0 | Status: SHIPPED | OUTPATIENT
Start: 2020-06-12

## 2020-06-12 RX ORDER — SUCRALFATE ORAL 1 G/10ML
1 SUSPENSION ORAL ONCE
Status: COMPLETED | OUTPATIENT
Start: 2020-06-12 | End: 2020-06-12

## 2020-06-12 RX ORDER — FAMOTIDINE 20 MG/1
20 TABLET, FILM COATED ORAL
Qty: 30 TABLET | Refills: 0 | Status: SHIPPED | OUTPATIENT
Start: 2020-06-12

## 2020-06-12 RX ADMIN — LIDOCAINE HYDROCHLORIDE 30 ML: 20 SOLUTION ORAL; TOPICAL at 11:23

## 2020-06-12 RX ADMIN — KETOROLAC TROMETHAMINE 15 MG: 15 INJECTION, SOLUTION INTRAMUSCULAR; INTRAVENOUS at 11:23

## 2020-06-12 RX ADMIN — SODIUM CHLORIDE 1000 ML: 9 INJECTION, SOLUTION INTRAVENOUS at 11:30

## 2020-06-12 RX ADMIN — ONDANSETRON 4 MG: 2 INJECTION INTRAMUSCULAR; INTRAVENOUS at 11:23

## 2020-06-12 RX ADMIN — SUCRALFATE 1 G: 1 SUSPENSION ORAL at 14:38

## 2020-06-12 RX ADMIN — DOCUSATE SODIUM 286 ML: 50 LIQUID ORAL at 14:09

## 2020-06-12 ASSESSMENT — ENCOUNTER SYMPTOMS
MYALGIAS: 1
CONSTIPATION: 1
DIARRHEA: 0
FEVER: 0
NAUSEA: 1
DYSURIA: 0
COUGH: 0

## 2020-06-12 NOTE — ED NOTES
Vss. Pain improved some with medication and bm. Voids without dif. Pt frustrated with pain . Reminded pt  To anser call from GI   to schedule endoscopy. Scripts given to pt and reviewed. All info done via ipad .

## 2020-06-12 NOTE — ED NOTES
ED Care Manager Note      Met with: Patient via IPAD using Cypriot : Neftali    Data:     Reason for ED visit:   Abdominal pain  Cognitive Status: awake, alert and oriented.  Primary Care Clinic [facility]: [facility]  Primary Care MD Name: Christian  Contact information and PCP information verified: Yes   Insurance concerns: No Insurance issues identified     Assessment:    Identified issues/concerns regarding health management:     Pt needs a follow up appointment with gastroenterology for an endoscopy.    Action/Resources:    I phoned [facility], [phone], [address] , [address], [place]  [address]. I attempted to schedule an appointment for Pt, but did not know enough information.  The clinic will have their Cypriot speaking  phone patient to schedule an appointment, as soon as possible.    Plan:    Will continue to follow and assist as needed.      Caitlin Hernandez RN Care Coordinator,  RACHEL, PHN, CCM, TIP  Inpatient Care Coordination - Emergency Department  [facility]   [phone]

## 2020-06-12 NOTE — ED TRIAGE NOTES
"Pt has shortness of breath with abdominal pain for past 2-3 months.  She has \"burning\" abdominal pain.  "

## 2020-06-12 NOTE — ED TRIAGE NOTES
She has been seen in this and other EDs for these sxs.  Abdominal pain is causing her to not be able to eat.  ED in [place] visited 3 days ago where blood work and scan was done.

## 2020-06-12 NOTE — DISCHARGE INSTRUCTIONS
A [place] speaking  from [facility],  [phone], will be calling you to schedule you to have an endoscopy.    [facility], [phone], kmio quiñonesmicky gallo chesterrubenid elba maskaxhali.    Discharge Instructions  Abdominal Pain    Abdominal pain (belly pain) can be caused by many things. Your evaluation today does not show the exact cause for your pain. Your provider today has decided that it is unlikely your pain is due to a life threatening problem, or a problem requiring surgery or hospital admission. Sometimes those problems cannot be found right away, so it is very important that you follow up as directed.  Sometimes only the changes which occur over time allow the cause of your pain to be found.    Generally, every Emergency Department visit should have a follow-up clinic visit with either a primary or a specialty clinic/provider. Please follow-up as instructed by your emergency provider today. With abdominal pain, we often recommend very close follow-up, such as the following day.    ADULTS:  Return to the Emergency Department right away if:    You get an oral temperature above 102oF or as directed by your provider.  You have blood in your stools. This may be bright red or appear as black, tarry stools.    You keep vomiting (throwing up) or cannot drink liquids.  You see blood when you vomit.   You cannot have a bowel movement or you cannot pass gas.  Your stomach gets bloated or bigger.  Your skin or the whites of your eyes look yellow.  You faint.  You have bloody, frequent or painful urination (peeing).  You have new symptoms or anything that worries you.    CHILDREN:  Return to the Emergency Department right away if your child has any of the above-listed symptoms or the following:    Pushes your hand away or screams/cries when his/her belly is touched.  You notice your child is very fussy  or weak.  Your child is very tired and is too tired to eat or drink.  Your child is dehydrated.  Signs of dehydration can be:  Significant change in the amount of wet diapers/urine.  Your infant or child starts to have dry mouth and lips, or no saliva (spit) or tears.    PREGNANT WOMEN:  Return to the Emergency Department right away if you have any of the above-listed symptoms or the following:    You have bleeding, leaking fluid or passing tissue from the vagina.  You have worse pain or cramping, or pain in your shoulder or back.  You have vomiting that will not stop.  You have a temperature of 100oF or more.  Your baby is not moving as much as usual.  You faint.  You get a bad headache with or without eye problems and abdominal pain.  You have a seizure.  You have unusual discharge from your vagina and abdominal pain.    Abdominal pain is pretty common during pregnancy.  Your pain may or may not be related to your pregnancy. You should follow-up closely with your OB provider so they can evaluate you and your baby.  Until you follow-up with your regular provider, do the following:     Avoid sex and do not put anything in your vagina.  Drink clear fluids.  Only take medications approved by your provider.    MORE INFORMATION:    Appendicitis:  A possible cause of abdominal pain in any person who still has their appendix is acute appendicitis. Appendicitis is often hard to diagnose.  Testing does not always rule out early appendicitis or other causes of abdominal pain. Close follow-up with your provider and re-evaluations may be needed to figure out the reason for your abdominal pain.    Follow-up:  It is very important that you make an appointment with your clinic and go to the appointment.  If you do not follow-up with your primary provider, it may result in missing an important development which could result in permanent injury or disability and/or lasting pain.  If there is any problem keeping your appointment,  "call your provider or return to the Emergency Department.    Medications:  Take your medications as directed by your provider today.  Before using over-the-counter medications, ask your provider and make sure to take the medications as directed.  If you have any questions about medications, ask your provider.    Diet:  Resume your normal diet as much as possible, but do not eat fried, fatty or spicy foods while you have pain.  Do not drink alcohol or have caffeine.  Do not smoke tobacco.    Probiotics: If you have been given an antibiotic, you may want to also take a probiotic pill or eat yogurt with live cultures. Probiotics have \"good bacteria\" to help your intestines stay healthy. Studies have shown that probiotics help prevent diarrhea (loose stools) and other intestine problems (including C. diff infection) when you take antibiotics. You can buy these without a prescription in the pharmacy section of the store.     If you were given a prescription for medicine here today, be sure to read all of the information (including the package insert) that comes with your prescription.  This will include important information about the medicine, its side effects, and any warnings that you need to know about.  The pharmacist who fills the prescription can provide more information and answer questions you may have about the medicine.  If you have questions or concerns that the pharmacist cannot address, please call or return to the Emergency Department.       Remember that you can always come back to the Emergency Department if you are not able to see your regular provider in the amount of time listed above, if you get any new symptoms, or if there is anything that worries you.    Take Omeprazole(40mg) once daily before breakfast and 1 Pepcid(20mg) at night daily. Carafate as needed 4 times daily.     "

## 2020-06-12 NOTE — ED NOTES
Notified by ERT that pt's person BGT read 48, Provider advised and presented to bedside. Pt remains alert, talking and at baseline, crackers and juice provided. BGT also checked on ED meter and read 44.

## 2020-06-12 NOTE — ED AVS SNAPSHOT
[facility] Emergency Department  [address]  [facility] [address]  Phone:  [phone]  Fax:  [phone]                                    Jayne Merchant   MRN: [id number]    Department:  [facility] Emergency Department   Date of Visit:  6/12/2020           After Visit Summary Signature Page    I have received my discharge instructions, and my questions have been answered. I have discussed any challenges I see with this plan with the nurse or doctor.    ..........................................................................................................................................  Patient/Patient Representative Signature      ..........................................................................................................................................  Patient Representative Print Name and Relationship to Patient    ..................................................               ................................................  Date                                   Time    ..........................................................................................................................................  Reviewed by Signature/Title    ...................................................              ..............................................  Date                                               Time          22EPIC Rev 08/18

## 2020-06-12 NOTE — ED PROVIDER NOTES
History     Chief Complaint: Multiple Complaints; Abdominal Pain; Nausea; Chest Pain; Fever; and Shortness of Breath    The history is provided by the patient. A  was used.      Jayne Merchant is a 55 year old female with a history of H. Pylori, [place], diabetes mellitus type 1, and dyspepsia who presents with a variety of complaints, including abdominal pain, nausea, chest pain, and shortness of breath. She has been seen for similar complaints on 06/02 and 06/10. EKG, chest XR, and labs were unremarkable at that time, and she tested negative for COVID-19 both times. Labs and imaging are summarized below. Today, she notes she has been unable to eat and feels unlike her self, and her symptoms have been present intermittently since April. She notes her symptoms have progressively worsened since that time. She notes she has not had an endoscopy in the remote past and endorses a history of H. Pylori. She notes she is frustrated because no one has found a cause of her symptoms. She states she does not take anything for her stomach despite being prescribed a variety of medications, and the one medication she tried for reflux did not help much. She later clarifies she typically takes Omeprazole as needed, such as when she eats spicy or oily foods, though recently she has been taking it daily. She mentions pain in her shoulder blades. She endorses myalgias, constipation, and nausea. She denies cough, fever, diarrhea, and burning with urination.    Labs & Imaging from 06/10 ED Visit:  Chest XR Port 1 view: Normal chest radiograph.   COVID-19: Negative   C-reactive protein: 0.84 (H)  CBC: WNL (WBC 8.8, HGB 12.5, )  CMP: ALT 60 (H) o/w WNL (Creatinine 0.78)  INR: 1.0  Troponin: <0.015  Lipase: 36.2    04/16 RUQ Limited Abdomen US:  INDICATION: Right upper quadrant pain. FINDINGS: Liver: Liver demonstrates normal echotexture. There is no evidence of mass or intrahepatic biliary dilatation.  Gallbladder: Anechoic fluid is seen without evidence of gallstone. Gallbladder wall is normal thickness. Negative sonographic Oliver sign is reported. Common bile duct: 4 mm. Pancreas: Visualized portions of the pancreas demonstrates normal echotexture. Right kidney: There is no hydronephrosis or mass.   IMPRESSION: Normal right upper quadrant ultrasound.     Allergies:  Statins-Hmg-Coa Reductase inhibitors    Medications:    Omeprazole    Past Medical History:    Type 1 diabetes mellitus   Fibromyalgia  Lipoma  Chronic tension headache  Hyperlipidemia  Memory deficit  H. Pylori  Dyspepsia  Obstructive sleep apnea   Chronic neck pain  Female circumcision  Malaria  Immunity to Hep A, Hep B, and varicella    Past Surgical History:        Family History:    History reviewed. No pertinent family history.     Social History:  Smoking status: Never  Alcohol use: No  Drug use: No  PCP: [facility]  Marital Status:   [5]    Review of Systems   Constitutional: Negative for fever.   Respiratory: Negative for cough.    Gastrointestinal: Positive for constipation and nausea. Negative for diarrhea.   Genitourinary: Negative for dysuria.   Musculoskeletal: Positive for myalgias.   All other systems reviewed and are negative.    Physical Exam     Patient Vitals for the past 24 hrs:   BP Temp Temp src Pulse Heart Rate Resp SpO2 Weight   20 1500 138/71 -- -- 90 -- -- 100 % --   20 1430 138/73 -- -- 92 -- -- 99 % --   20 1400 -- -- -- -- -- -- 98 % --   20 1345 (!) 158/78 -- -- 94 -- -- 99 % --   20 1344 -- 97.8  F (36.6  C) Axillary -- -- -- -- --   20 1330 -- -- -- 98 -- -- 98 % --   20 1315 -- -- -- 95 -- -- 99 % --   20 1300 -- -- -- 100 -- -- 100 % --   20 1245 -- -- -- 95 -- -- 97 % --   20 1230 -- -- -- 98 -- -- 99 % --   20 1229 -- -- -- -- -- -- 99 % --   20 -- -- -- -- -- -- 99 % --   20 -- -- -- -- -- -- 98 %  --   06/12/20 1226 -- -- -- -- -- -- 99 % --   06/12/20 1225 -- -- -- -- -- -- 99 % --   06/12/20 1224 -- -- -- -- -- -- 99 % --   06/12/20 1223 -- -- -- -- -- -- 100 % --   06/12/20 1222 -- -- -- -- -- -- 100 % --   06/12/20 1221 -- -- -- -- -- -- 99 % --   06/12/20 1220 -- -- -- -- -- -- 99 % --   06/12/20 1219 -- -- -- -- -- -- 98 % --   06/12/20 1218 -- -- -- -- -- -- 97 % --   06/12/20 1217 -- -- -- -- -- -- 99 % --   06/12/20 1216 -- -- -- -- -- -- 99 % --   06/12/20 1215 -- -- -- 96 -- -- 99 % --   06/12/20 1214 -- -- -- -- -- -- 100 % --   06/12/20 1213 -- -- -- -- -- -- 99 % --   06/12/20 1212 -- -- -- -- -- -- 99 % --   06/12/20 1211 -- -- -- -- -- -- 99 % --   06/12/20 1210 -- -- -- -- -- -- 99 % --   06/12/20 1209 -- -- -- -- -- -- 99 % --   06/12/20 1208 -- -- -- -- -- -- 99 % --   06/12/20 1207 -- -- -- -- -- -- 99 % --   06/12/20 1206 -- -- -- -- -- -- 99 % --   06/12/20 1205 -- -- -- -- -- -- 99 % --   06/12/20 1204 -- -- -- -- -- -- 100 % --   06/12/20 1203 -- -- -- -- -- -- 100 % --   06/12/20 1202 -- -- -- -- -- -- 100 % --   06/12/20 1201 -- -- -- -- -- -- 100 % --   06/12/20 1200 (!) 174/125 -- -- 108 -- -- 100 % --   06/12/20 1145 (!) 142/71 -- -- 91 -- -- 99 % --   06/12/20 1130 125/60 -- -- 87 -- -- 98 % --   06/12/20 1115 129/73 -- -- 91 -- -- 98 % --   06/12/20 1100 138/67 -- -- 88 -- -- 99 % --   06/12/20 1035 -- 98.1  F (36.7  C) Oral -- -- -- -- 69.8 kg (153 lb 14.1 oz)   06/12/20 1032 (!) 145/75 -- -- -- 93 18 100 % --     Physical Exam  General: Appears uncomfortable. Alert and oriented.   Head:  The scalp, face, and head appear normal   Eyes:  Conjunctivae and sclerae are normal    ENT:    The oropharynx is normal    Uvula is in the midline     Moist mucous membranes   Neck:  No lymphadenopathy  CV:  Regular rate and rhythm     Normal S1/S2    Peripheral pulses intact in all 4 extremities    No peripheral edema.   Resp:  Lungs are clear to auscultation    Non-labored    No rales or  wheezing   GI:  Epigastric abdominal tenderness    No rebound or guarding    No additional abdominal tenderness.     Normal bowel sounds.  MS:  Normal muscular tone. Moving all 4 extremties  Skin:  No rash or acute skin lesions noted   Neuro: Speech is normal and fluent.     Emergency Department Course   ECG (11:30:41):  Rate 84 bpm. VA interval 134. QRS duration 80. QT/QTc 362/427. P-R-T axes 75 40 53. Normal sinus rhythm. Normal EKG. No significant change compared to EKG dated 04/14/2020. Interpreted at 1135 by Quiana Pathak PA.    Imaging:  Radiographic findings were communicated with the patient who voiced understanding of the findings.    RUQ Limited Abdomen US:  No acute process demonstrated.   As read by Radiology.    Chest XR Port 1 view:  No acute disease.   As read by radiology.     Laboratory:  Glucose by meter (collected 1117): 131 (H)  Glucose by meter (collected 1248): 44 (LL)  Glucose by meter (collected 1304): 94   Glucose by meter (collected 1312): 107 (H)  Urinalysis with micro: All negative   CMP: Glucose 131 (H), ALT 70 (H) o/w WNL (Creatinine 0.74)  Troponin (collected 1117): <0.015  Lipase: 133  CBC: WNL (WBC 7.4, HGB 12.1, )      Procedures: None.    Interventions:  1120 NS 1L IV Bolus  1123 Maalox ES GI Cocktail  1123 Zofran 4 mg IV  1123 Toradol 15 mg IV  1409 Pink lady edema 286 mL rectal  1438 Carafate suspension 1 g PO    Emergency Department Course:  Past medical records, nursing notes, and vitals reviewed.  1049: I performed an exam of the patient and obtained history, as documented above.    EKG performed, results above.    The patient was sent for an RUQ Abdomen US and Chest XR while in the emergency department, findings above.    IV inserted and Blood drawn. This was sent to the lab for further testing, results above.    The patient provided a urine sample here in the emergency department. This was sent for laboratory testing, findings above.    1505: I rechecked the  patient. Findings and plan explained to the patient. Patient discharged home with instructions regarding supportive care, medications, and reasons to return. The importance of close follow-up was reviewed.     Impression & Plan    Covid-19  Jayne Merchant was evaluated during a global COVID-19 pandemic, which necessitated consideration that the patient might be at risk for infection with the SARS-CoV-2 virus that causes COVID-19.   Applicable protocols for evaluation were followed during the patient's care.   COVID-19 was considered as part of the patient's evaluation. The plan for testing is:  a test was obtained at a previous visit and reviewed & considered today.  the patient was referred for outpatient testing.    Medical Decision Making:  Jayne Merchant is a 55 year old female who presents for evaluation of epigastric abdominal pain, and nausea.  She has been seen multiple times for this in recent past with negative work-up.  She has been noncompliant with her outpatient medication regimen, and states that she is only been intermittently taking omeprazole.  She states that her symptoms have been more persistent the last week, so she has been taking this daily.  She denies taking any other medication.  She was most recently seen on 6/10 where lab work and a chest x-ray was performed which was unremarkable.  A broad differential was considered including but not limited to ACS, PE, dissection, gastritis, H. pylori infection, pancreatitis, peptic ulcer disease, GERD, cholecystitis, biliary colic, hepatitis, small bowel obstruction, mesenteric ischemia, appendicitis, diverticulitis, UTI, pyelonephritis, intra-abdominal abscess, among others.  Lab work here is very reassuring including normal white blood cell count and stable hemoglobin.  UA is normal without evidence of infection.  Right upper quadrant ultrasound was negative for acute abnormality.  Chest x-ray is reassuring.  The patient has been seen  several times for this and there is a questionable medication noncompliance, and poor follow-through.  I did touch base with Elinor the care coordinator, who will see this patient.  She was able to reach out to [facility], and will have the Tunisian speaking  call the patient later today to get an appointment.  I feel this patient would benefit from an endoscopy, and GI referral. Unfortunately, during the patient's stay, she was noted to be hypoglycemic at 44.  The patient was given oral orange juice, and crackers, and her blood sugar rebounded as per above.  Patient felt symptomatically improved after this intervention.  I discussed with the patient about the plan, and she expressed understanding.  Omeprazole, Pepcid, and Carafate sent for home.  Again emphasized the importance of following up with [facility] for evaluation, and possible endoscopy.  Red flag symptoms, and reasons for return were discussed and understood.  All questions were answered prior to discharge.  The patient understands and agrees to this plan.    Critical Care time:  none    Diagnosis:    ICD-10-CM    1. Abdominal pain, epigastric  R10.13    2. Nausea  R11.0    3. Headache  R51        Disposition: discharged to home    Discharge Medications:  Discharge Medication List as of 6/12/2020  3:28 PM      START taking these medications    Details   famotidine (PEPCID) 20 MG tablet Take 1 tablet (20 mg) by mouth nightly as needed for heartburn, Disp-30 tablet,R-0, Local Print      omeprazole (PRILOSEC) 20 MG DR capsule Take 2 capsules (40 mg) by mouth every morning (before breakfast), Disp-60 capsule,R-0, Local Print      sucralfate (CARAFATE) 1 GM/10ML suspension Take 10 mLs (1 g) by mouth 4 times daily, Disp-420 mL,R-0, Local Print           Travis LORENZANA, corry serving as a scribe at 11:12 AM on 6/12/2020 to document services personally performed by Quiana Pathak PA based on my observations and the provider's  statements to me.       Travis Saucedo  6/12/2020   [facility] EMERGENCY DEPARTMENT       Quiana Pathak PA-C  06/12/20 [id number]

## 2020-07-14 ENCOUNTER — HOSPITAL ENCOUNTER (EMERGENCY)
Facility: CLINIC | Age: 55
Discharge: HOME | End: 2020-07-14
Attending: EMERGENCY MEDICINE | Admitting: EMERGENCY MEDICINE
Payer: COMMERCIAL

## 2020-07-14 VITALS
HEART RATE: 85 BPM | OXYGEN SATURATION: 100 % | TEMPERATURE: 98.8 F | RESPIRATION RATE: 18 BRPM | SYSTOLIC BLOOD PRESSURE: 143 MMHG | DIASTOLIC BLOOD PRESSURE: 68 MMHG

## 2020-07-14 DIAGNOSIS — K29.50 CHRONIC GASTRITIS WITHOUT BLEEDING, UNSPECIFIED GASTRITIS TYPE: ICD-10-CM

## 2020-07-14 LAB
ALBUMIN SERPL-MCNC: 3.8 G/DL (ref 3.4–5)
ALP SERPL-CCNC: 106 U/L (ref 40–150)
ALT SERPL W P-5'-P-CCNC: 28 U/L (ref 0–50)
ANION GAP SERPL CALCULATED.3IONS-SCNC: 7 MMOL/L (ref 3–14)
AST SERPL W P-5'-P-CCNC: 16 U/L (ref 0–45)
BASOPHILS # BLD AUTO: 0 10E9/L (ref 0–0.2)
BASOPHILS NFR BLD AUTO: 0.3 %
BILIRUB SERPL-MCNC: 0.1 MG/DL (ref 0.2–1.3)
BUN SERPL-MCNC: 17 MG/DL (ref 7–30)
CALCIUM SERPL-MCNC: 9.3 MG/DL (ref 8.5–10.1)
CHLORIDE SERPL-SCNC: 107 MMOL/L (ref 94–109)
CO2 SERPL-SCNC: 26 MMOL/L (ref 20–32)
CREAT SERPL-MCNC: 0.67 MG/DL (ref 0.52–1.04)
DIFFERENTIAL METHOD BLD: NORMAL
EOSINOPHIL # BLD AUTO: 0.1 10E9/L (ref 0–0.7)
EOSINOPHIL NFR BLD AUTO: 0.9 %
ERYTHROCYTE [DISTWIDTH] IN BLOOD BY AUTOMATED COUNT: 12.5 % (ref 10–15)
GFR SERPL CREATININE-BSD FRML MDRD: >90 ML/MIN/{1.73_M2}
GLUCOSE SERPL-MCNC: 78 MG/DL (ref 70–99)
HCT VFR BLD AUTO: 40.2 % (ref 35–47)
HGB BLD-MCNC: 13.4 G/DL (ref 11.7–15.7)
IMM GRANULOCYTES # BLD: 0 10E9/L (ref 0–0.4)
IMM GRANULOCYTES NFR BLD: 0.3 %
LIPASE SERPL-CCNC: 132 U/L (ref 73–393)
LYMPHOCYTES # BLD AUTO: 2 10E9/L (ref 0.8–5.3)
LYMPHOCYTES NFR BLD AUTO: 22.8 %
MCH RBC QN AUTO: 30 PG (ref 26.5–33)
MCHC RBC AUTO-ENTMCNC: 33.3 G/DL (ref 31.5–36.5)
MCV RBC AUTO: 90 FL (ref 78–100)
MONOCYTES # BLD AUTO: 0.7 10E9/L (ref 0–1.3)
MONOCYTES NFR BLD AUTO: 8.4 %
NEUTROPHILS # BLD AUTO: 5.9 10E9/L (ref 1.6–8.3)
NEUTROPHILS NFR BLD AUTO: 67.3 %
NRBC # BLD AUTO: 0 10*3/UL
NRBC BLD AUTO-RTO: 0 /100
PLATELET # BLD AUTO: 297 10E9/L (ref 150–450)
POTASSIUM SERPL-SCNC: 4 MMOL/L (ref 3.4–5.3)
PROT SERPL-MCNC: 7.9 G/DL (ref 6.8–8.8)
RBC # BLD AUTO: 4.46 10E12/L (ref 3.8–5.2)
SODIUM SERPL-SCNC: 140 MMOL/L (ref 133–144)
WBC # BLD AUTO: 8.8 10E9/L (ref 4–11)

## 2020-07-14 PROCEDURE — 96374 THER/PROPH/DIAG INJ IV PUSH: CPT

## 2020-07-14 PROCEDURE — 85025 COMPLETE CBC W/AUTO DIFF WBC: CPT | Performed by: EMERGENCY MEDICINE

## 2020-07-14 PROCEDURE — C9113 INJ PANTOPRAZOLE SODIUM, VIA: HCPCS | Performed by: EMERGENCY MEDICINE

## 2020-07-14 PROCEDURE — 25000125 ZZHC RX 250: Performed by: EMERGENCY MEDICINE

## 2020-07-14 PROCEDURE — 96361 HYDRATE IV INFUSION ADD-ON: CPT

## 2020-07-14 PROCEDURE — 83690 ASSAY OF LIPASE: CPT | Performed by: EMERGENCY MEDICINE

## 2020-07-14 PROCEDURE — 25800030 ZZH RX IP 258 OP 636: Performed by: EMERGENCY MEDICINE

## 2020-07-14 PROCEDURE — 80053 COMPREHEN METABOLIC PANEL: CPT | Performed by: EMERGENCY MEDICINE

## 2020-07-14 PROCEDURE — 25000132 ZZH RX MED GY IP 250 OP 250 PS 637: Performed by: EMERGENCY MEDICINE

## 2020-07-14 PROCEDURE — 99284 EMERGENCY DEPT VISIT MOD MDM: CPT | Mod: 25

## 2020-07-14 PROCEDURE — 25000128 H RX IP 250 OP 636: Performed by: EMERGENCY MEDICINE

## 2020-07-14 PROCEDURE — 96375 TX/PRO/DX INJ NEW DRUG ADDON: CPT

## 2020-07-14 RX ORDER — ONDANSETRON 4 MG/1
4 TABLET, ORALLY DISINTEGRATING ORAL EVERY 8 HOURS PRN
Qty: 10 TABLET | Refills: 0 | Status: SHIPPED | OUTPATIENT
Start: 2020-07-14

## 2020-07-14 RX ORDER — ONDANSETRON 2 MG/ML
4 INJECTION INTRAMUSCULAR; INTRAVENOUS ONCE
Status: COMPLETED | OUTPATIENT
Start: 2020-07-14 | End: 2020-07-14

## 2020-07-14 RX ADMIN — LIDOCAINE HYDROCHLORIDE 30 ML: 20 SOLUTION ORAL; TOPICAL at 20:38

## 2020-07-14 RX ADMIN — ONDANSETRON 4 MG: 2 INJECTION INTRAMUSCULAR; INTRAVENOUS at 20:38

## 2020-07-14 RX ADMIN — PANTOPRAZOLE SODIUM 40 MG: 40 INJECTION, POWDER, FOR SOLUTION INTRAVENOUS at 20:38

## 2020-07-14 RX ADMIN — FAMOTIDINE 20 MG: 10 INJECTION, SOLUTION INTRAVENOUS at 20:38

## 2020-07-14 RX ADMIN — SODIUM CHLORIDE 1000 ML: 9 INJECTION, SOLUTION INTRAVENOUS at 20:38

## 2020-07-14 ASSESSMENT — ENCOUNTER SYMPTOMS
DYSURIA: 0
VOMITING: 0
DIARRHEA: 0
COUGH: 0
NAUSEA: 0
SHORTNESS OF BREATH: 1
FEVER: 0
CONSTIPATION: 1
ABDOMINAL PAIN: 1

## 2020-07-14 NOTE — ED TRIAGE NOTES
ABCs intact. Pt c/o abdominal pain since last night. Denies v/d or urinary symptoms. Denies fever, cough, sob, sore throat or headache. C/o headache, denies hx headaches or migraines.

## 2020-07-14 NOTE — ED AVS SNAPSHOT
[facility] Emergency Department  [address]  [facility] [address]  Phone:  [phone]  Fax:  [phone]                                    Jayne Merchant   MRN: [id number]    Department:  [facility] Emergency Department   Date of Visit:  7/14/2020           After Visit Summary Signature Page    I have received my discharge instructions, and my questions have been answered. I have discussed any challenges I see with this plan with the nurse or doctor.    ..........................................................................................................................................  Patient/Patient Representative Signature      ..........................................................................................................................................  Patient Representative Print Name and Relationship to Patient    ..................................................               ................................................  Date                                   Time    ..........................................................................................................................................  Reviewed by Signature/Title    ...................................................              ..............................................  Date                                               Time          22EPIC Rev 08/18

## 2020-07-15 NOTE — DISCHARGE INSTRUCTIONS
Continue antacids daily.  Need to take 2 of the tablets in the bottle we looked at together every day no matter what.  This should last you another 10 days and by the end of this bottle you should see your regular doctor or gastroenterologist to discuss the results of your endoscopy and any other treatment or work-up as an outpatient. They will likely refill this medication. You should not miss any days.  You need a very bland diet at all times.  Avoid alcohol, cigarettes, caffeine, chocolate, greasy or spicy food, or any other foods that worsen your pain.  Return if you have black or bloody stools, change in pain, or any other new concerns.  Zofran as needed for nausea or vomiting.

## 2020-07-15 NOTE — ED PROVIDER NOTES
History     Chief Complaint:  Abdominal Pain    The history is provided by the patient. A  was used.      Jayne Merchant is a 55 year old female with DM who presents with abdominal pain. Jayne has had burning epigastric burning intermittently for the last 3 months. She felt this pain recur about 1.5 days ago after a couple weeks of feeling well. It is unchanged from prior episodes of this pain for which she has had several ER visits (8 ER visits since April, most recent imaging below) as well as an EGD performed 1 week ago (results below). She is taking omeprazole with little relief. Occasionally she feels shortness of breath because her abdomen gets bloated. She has not had a bowel movement for a few days. She otherwise denies nausea, vomiting, diarrhea, fever, cough, or dysuria.     [facility] endoscopy 7/7/2020:  LA Grade A reflux esophagitis. Biopsied.  Gastritis. Biopsied.  Biopsies were taken with a cold forceps for histology in the duodenal bulb.    US abdomen limited, 6/12/2020:  Liver is unremarkable in echogenicity without focal solid  lesions. Tiny liver cyst noted. Gallbladder is normal without stones  or sludge. Extrahepatic bile duct is normal in diameter. Pancreas is  normal where visualized. Right kidney is normal in size. There is no  hydronephrosis. Proximal aorta and IVC are nonaneurysmal.     CT abdomen pelvis w/ IV contrast 4/182020:   There is a cyst in the left lobe liver near the falciform ligament. The spleen, pancreas and adrenal glands are unremarkable. There is a stable cyst in the left kidney. There is no hydronephrosis seen in the kidneys. There is no evidence of a bowel obstruction. The appendix is visualized in the right lower quadrant and is unremarkable. There is no free fluid identified in the abdomen or pelvis. The abdominal aorta is normal in caliber. There is no adenopathy seen, as per radiology.     Allergies:  No Known Allergies     Medications:     Famotidine   Oxycodone  Sucralfate   Duloxetine  Nortriptyline  Lantus  Novolog  Lyrica  Omeprazole  Reglan  Singulair    Past Medical History:    Diabetes   Fibromyalgia   Lipoma of left upper extremity   Chronic tension headache   Hyperlipidemia  [place]  Dyspepsia   Female circumcision    Past Surgical History:    C section x 2    Family History:    No past pertinent family history.      Social History:  Presents alone.   Tobacco use: negative   Alcohol use: negative   PCP: [facility]     Review of Systems   Constitutional: Negative for fever.   Respiratory: Positive for shortness of breath. Negative for cough.    Gastrointestinal: Positive for abdominal distention (bloating), abdominal pain and constipation. Negative for diarrhea, nausea and vomiting.   Genitourinary: Negative for dysuria.   All other systems reviewed and are negative.    Physical Exam     Patient Vitals for the past 24 hrs:   BP Temp Temp src Pulse Heart Rate Resp SpO2   07/14/20 2150 (!) 143/68 -- -- 85 -- -- --   07/14/20 2140 -- -- -- -- -- -- 100 %   07/14/20 2120 -- -- -- 84 84 -- 99 %   07/14/20 2100 -- -- -- 81 81 18 98 %   07/14/20 2000 (!) 142/68 -- -- -- -- -- --   07/14/20 1709 (!) 149/86 98.8  F (37.1  C) Oral 86 -- 18 100 %        Physical Exam  General: Well-developed and well-nourished. Well appearing middle aged Nigerien woman. Cooperative.  Head:  Atraumatic.  Eyes:  Conjunctivae, lids, and sclerae are normal.  Neck:  Supple. Normal range of motion.  CV:  Regular rate and rhythm. Normal heart sounds with no murmurs, rubs, or gallops detected.  Resp:  No respiratory distress. Clear to auscultation bilaterally without decreased breath sounds, wheezing, rales, or rhonchi.  GI:  Soft. Non-distended. Non-tender.    MS:  Normal ROM.   Skin:  Warm. Non-diaphoretic. No pallor.  Neuro:  Awake. A&Ox3. Normal strength.  Psych: Normal mood and affect. Normal speech.  Vitals reviewed.    Emergency Department Course      Laboratory:  Laboratory findings were communicated with the patient who voiced understanding of the findings.    CBC: WBC 8.8, HGB 13.4,    CMP: bilirubin 0.1 (L) o/w WNL (Creatinine 0.67)  Lipase: 132     Interventions:  2038 NS 1 L IV   GI Cocktail (Maalox/Mylanta and viscous Lidocaine), 30 mL suspension, PO     Pepcid 20 mg IV   Protonix 40 mg IV   Zofran 4 mg IV     Emergency Department Course:  Past medical records, nursing notes, and vitals reviewed.    2003 I performed an exam of the patient as documented above.     IV was inserted and blood was drawn for laboratory testing, results above.    2214 Patient rechecked and updated. Passed PO challenge. Feeling improved.     I personally reviewed the laboratory  results with the patient and answered all related questions prior to discharge. I prescribed Zofran.     Impression & Plan     Medical Decision Making:  Jayne is a 55 year old woman who presents with epigastric abdominal pain.  She states this is been present for 3 months, resolving for short period of time before returning 1-2 days ago.  She had an endoscopy just 7 days ago but does not know the results.  She feels somewhat bloated and constipated but denies all other concerns.  She appears quite well on exam and has no reproducible abdominal tenderness.  This is her ninth visit in the last 3 months for epigastric abdominal pain and she has had a very thorough work-up including, as above, endoscopy.  Because she has had imaging and direct visualization, repeat imaging from the emergency department is unlikely to , particularly in an attempt to avoid radiation, and because she has had no change in pain today and no abdominal tendernewss.  Basic laboratory studies were obtained which, fortunately, revealed no anemia or leukocytosis.  She has no significant electrolyte derangements or kidney injury.  There is no evidence of biliary obstruction, pancreatitis, or hepatitis.  She  was given Zofran, Protonix, Pepcid, GI cocktail, and IV fluids and after these did feel improved.  She was able to tolerate a PO challenge.  This woman's symptoms are almost certainly related to the gastritis and esophagitis identified on her recent endoscopy.  She tells me she is taking omeprazole but when I look at the bottle with her, there are several pills left in a bottle that should have been completed 2 days ago if she had actually been taking this every day.  I stressed the importance of taking the omeprazole daily even if she is feeling well and to eat a very bland diet at all times.  She already has an gastroenterologist and agrees to follow-up with them or her primary care provider so she can get a refill of this medication and discuss any other nonemergent outpatient treatment or work-up.  I provided return precautions and answered all the patient's questions using a Mont[place]in .  She verbalized understanding and is amenable to discharge.    Discharge Diagnosis:    ICD-10-CM    1. Chronic gastritis without bleeding, unspecified gastritis type  K29.50      Disposition:  Discharged home.    Discharge Medications:  Discharge Medication List as of 7/14/2020 10:23 PM        Scribe Disclosure:  I, Mare Crawley, am serving as a scribe at 8:03 PM on 7/14/2020 to document services personally performed by Shayna Whitehead MD based on my observations and the provider's statements to me.       Shayna Whitehead MD  07/16/20 [id number]

## 2020-07-16 ASSESSMENT — ENCOUNTER SYMPTOMS: ABDOMINAL DISTENTION: 1

## 2020-08-21 ENCOUNTER — ANCILLARY PROCEDURE (OUTPATIENT)
Dept: GENERAL RADIOLOGY | Facility: CLINIC | Age: 55
End: 2020-08-21
Attending: FAMILY MEDICINE
Payer: COMMERCIAL

## 2020-08-21 DIAGNOSIS — R07.89 CHEST WALL TENDERNESS: ICD-10-CM

## 2020-08-21 DIAGNOSIS — M25.512 ACUTE PAIN OF LEFT SHOULDER: ICD-10-CM

## 2021-01-08 ENCOUNTER — HOSPITAL ENCOUNTER (OUTPATIENT)
Dept: CT IMAGING | Facility: CLINIC | Age: 56
Discharge: HOME | End: 2021-01-08
Attending: FAMILY MEDICINE | Admitting: FAMILY MEDICINE
Payer: COMMERCIAL

## 2021-01-08 DIAGNOSIS — R11.2 NAUSEA AND VOMITING, INTRACTABILITY OF VOMITING NOT SPECIFIED, UNSPECIFIED VOMITING TYPE: ICD-10-CM

## 2021-01-08 PROCEDURE — 74177 CT ABD & PELVIS W/CONTRAST: CPT

## 2021-01-08 PROCEDURE — 250N000009 HC RX 250

## 2021-01-08 PROCEDURE — 250N000011 HC RX IP 250 OP 636

## 2021-01-08 PROCEDURE — 74177 CT ABD & PELVIS W/CONTRAST: CPT | Mod: 26 | Performed by: RADIOLOGY

## 2021-01-08 RX ORDER — IOPAMIDOL 755 MG/ML
100 INJECTION, SOLUTION INTRAVASCULAR ONCE
Status: COMPLETED | OUTPATIENT
Start: 2021-01-08 | End: 2021-01-08

## 2021-01-08 RX ADMIN — IOPAMIDOL 103 ML: 755 INJECTION, SOLUTION INTRAVENOUS at 14:55

## 2021-01-08 RX ADMIN — SODIUM CHLORIDE 30 ML: 9 INJECTION, SOLUTION INTRAVENOUS at 14:55

## 2022-08-17 ENCOUNTER — HOSPITAL ENCOUNTER (OUTPATIENT)
Dept: GENERAL RADIOLOGY | Facility: CLINIC | Age: 57
Discharge: HOME | End: 2022-08-17
Attending: NURSE PRACTITIONER | Admitting: NURSE PRACTITIONER
Payer: COMMERCIAL

## 2022-08-17 ENCOUNTER — MEDICAL CORRESPONDENCE (OUTPATIENT)
Dept: HEALTH INFORMATION MANAGEMENT | Facility: CLINIC | Age: 57
End: 2022-08-17

## 2022-08-17 DIAGNOSIS — R06.9 ABNORMAL BREATHING: ICD-10-CM

## 2022-08-17 PROCEDURE — 71046 X-RAY EXAM CHEST 2 VIEWS: CPT

## 2022-08-17 PROCEDURE — 71046 X-RAY EXAM CHEST 2 VIEWS: CPT | Mod: 26 | Performed by: RADIOLOGY

## 2022-08-30 ENCOUNTER — MEDICAL CORRESPONDENCE (OUTPATIENT)
Dept: HEALTH INFORMATION MANAGEMENT | Facility: CLINIC | Age: 57
End: 2022-08-30

## 2022-08-31 NOTE — TELEPHONE ENCOUNTER
Action 8/31/22 sv    Action Taken Message sent to nurse pool to place PFT order       RECORDS RECEIVED FROM: internal    DATE RECEIVED: 9.27.22    NOTES STATUS DETAILS   OFFICE NOTE from referring provider internal  Per Marcel (Care Coordinator)   OFFICE NOTE from other specialist     DISCHARGE SUMMARY from hospital     DISCHARGE REPORT from the ER     MEDICATION LIST internal     IMAGING  (NEED IMAGES AND REPORTS)     CT SCAN     CHEST XRAY (CXR) internal  8/17/22    TESTS     PULMONARY FUNCTION TESTING (PFT)

## 2022-09-01 ENCOUNTER — TELEPHONE (OUTPATIENT)
Dept: PULMONOLOGY | Facility: CLINIC | Age: 57
End: 2022-09-01

## 2022-09-01 DIAGNOSIS — J42 CHRONIC BRONCHITIS, UNSPECIFIED CHRONIC BRONCHITIS TYPE (H): Primary | ICD-10-CM

## 2022-09-01 NOTE — TELEPHONE ENCOUNTER
Called pt via  line no answer call went directly to [facility](no my chart) to contact call center and schedule Full pfts and CXR prior to 9/27  Visit with Dr. Gomez. [phone]

## 2022-09-08 ENCOUNTER — TELEPHONE (OUTPATIENT)
Dept: PULMONOLOGY | Facility: CLINIC | Age: 57
End: 2022-09-08

## 2022-09-27 ENCOUNTER — PRE VISIT (OUTPATIENT)
Dept: PULMONOLOGY | Facility: CLINIC | Age: 57
End: 2022-09-27

## 2023-08-04 ENCOUNTER — ANCILLARY PROCEDURE (OUTPATIENT)
Dept: GENERAL RADIOLOGY | Facility: CLINIC | Age: 58
End: 2023-08-04
Attending: NURSE PRACTITIONER
Payer: COMMERCIAL

## 2023-08-04 DIAGNOSIS — M54.50 CHRONIC MIDLINE LOW BACK PAIN WITHOUT SCIATICA: ICD-10-CM

## 2023-08-04 DIAGNOSIS — G89.29 CHRONIC MIDLINE LOW BACK PAIN WITHOUT SCIATICA: ICD-10-CM

## 2023-08-04 PROCEDURE — 72100 X-RAY EXAM L-S SPINE 2/3 VWS: CPT | Performed by: STUDENT IN AN ORGANIZED HEALTH CARE EDUCATION/TRAINING PROGRAM

## 2023-09-20 ENCOUNTER — ANCILLARY PROCEDURE (OUTPATIENT)
Dept: BONE DENSITY | Facility: CLINIC | Age: 58
End: 2023-09-20
Attending: NURSE PRACTITIONER
Payer: COMMERCIAL

## 2023-09-20 DIAGNOSIS — M43.9 COMPRESSION DEFORMITY OF VERTEBRA: ICD-10-CM

## 2023-09-20 DIAGNOSIS — Z78.0 ENCOUNTER FOR OSTEOPOROSIS SCREENING IN ASYMPTOMATIC POSTMENOPAUSAL PATIENT: ICD-10-CM

## 2023-09-20 DIAGNOSIS — Z13.820 ENCOUNTER FOR OSTEOPOROSIS SCREENING IN ASYMPTOMATIC POSTMENOPAUSAL PATIENT: ICD-10-CM

## 2023-09-20 PROCEDURE — 77080 DXA BONE DENSITY AXIAL: CPT | Performed by: INTERNAL MEDICINE

## 2024-01-25 ENCOUNTER — HOSPITAL ENCOUNTER (EMERGENCY)
Facility: CLINIC | Age: 59
Discharge: HOME | End: 2024-01-25
Attending: EMERGENCY MEDICINE | Admitting: EMERGENCY MEDICINE
Payer: COMMERCIAL

## 2024-01-25 ENCOUNTER — APPOINTMENT (OUTPATIENT)
Dept: INTERPRETER SERVICES | Facility: CLINIC | Age: 59
End: 2024-01-25
Payer: COMMERCIAL

## 2024-01-25 ENCOUNTER — APPOINTMENT (OUTPATIENT)
Dept: GENERAL RADIOLOGY | Facility: CLINIC | Age: 59
End: 2024-01-25
Attending: EMERGENCY MEDICINE
Payer: COMMERCIAL

## 2024-01-25 VITALS
OXYGEN SATURATION: 99 % | HEART RATE: 91 BPM | DIASTOLIC BLOOD PRESSURE: 66 MMHG | TEMPERATURE: 98.1 F | SYSTOLIC BLOOD PRESSURE: 145 MMHG | RESPIRATION RATE: 14 BRPM

## 2024-01-25 DIAGNOSIS — R07.89 RIGHT-SIDED CHEST WALL PAIN: ICD-10-CM
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
## 2024-01-25 PROCEDURE — 73060 X-RAY EXAM OF HUMERUS: CPT | Mod: RT

## 2024-01-25 PROCEDURE — 99284 EMERGENCY DEPT VISIT MOD MDM: CPT | Performed by: EMERGENCY MEDICINE

## 2024-01-25 PROCEDURE — 250N000013 HC RX MED GY IP 250 OP 250 PS 637: Performed by: EMERGENCY MEDICINE

## 2024-01-25 PROCEDURE — 99283 EMERGENCY DEPT VISIT LOW MDM: CPT | Mod: 25 | Performed by: EMERGENCY MEDICINE

## 2024-01-25 PROCEDURE — 71046 X-RAY EXAM CHEST 2 VIEWS: CPT

## 2024-01-25 RX ORDER — HYDROCODONE BITARTRATE AND ACETAMINOPHEN 5; 325 MG/1; MG/1
1 TABLET ORAL ONCE
Status: COMPLETED | OUTPATIENT
Start: 2024-01-25 | End: 2024-01-25

## 2024-01-25 RX ORDER — HYDROCODONE BITARTRATE AND ACETAMINOPHEN 5; 325 MG/1; MG/1
1 TABLET ORAL EVERY 6 HOURS PRN
Qty: 10 TABLET | Refills: 0 | Status: SHIPPED | OUTPATIENT
Start: 2024-01-25 | End: 2024-01-28

## 2024-01-25 RX ADMIN — HYDROCODONE BITARTRATE AND ACETAMINOPHEN 1 TABLET: 5; 325 TABLET ORAL at 12:46

## 2024-01-25 ASSESSMENT — ACTIVITIES OF DAILY LIVING (ADL)
ADLS_ACUITY_SCORE: 35
ADLS_ACUITY_SCORE: 33
ADLS_ACUITY_SCORE: 35

## 2024-01-25 NOTE — ED PROVIDER NOTES
[facility] [place] ([facility])    1/25/24      ED PROVIDER NOTE   Hallway L 12:35 PM   History     Chief Complaint   Patient presents with    Fall     The history is provided by the patient and medical records. A  was used ([employer]  via iPad).     Nenita Varela is a 59 year old female with history of type II diabetes mellitus, chronic compression deformity of  L4, left sided sciatica who presents for evaluation after a mechanical slip and fall. She was at home and tripped on a rug, falling to her wood floor and landing on her right ribs. The fall happened 3 days ago. No head injury with this, no loss of consciousness.  It makes her ribs hurt worse to breathe and to lay on that side. She is eating ok. She is walking ok. She is not on anticoagulation. She was able to get up afterwards with her daughter's assistance, but has muscle aches. She tried some pain killer at home without improvement, doesn't know what this medication was.     Past Medical History  No past medical history on file.  No past surgical history on file.  ibuprofen (ADVIL,MOTRIN) 600 MG tablet      Allergies   Allergen Reactions    Penicillins Other (See Comments) and Rash     Family History  No family history on file.  Social History          A medically appropriate review of systems was performed with pertinent positives and negatives noted in the HPI, and all other systems negative.    Physical Exam   BP: (!) 145/66  Pulse: 91  Temp: 98.1  F (36.7  C)  Resp: 14  SpO2: 99 %  Physical Exam  Constitutional:       Appearance: She is well-developed.   HENT:      Head: Normocephalic and atraumatic.   Cardiovascular:      Rate and Rhythm: Normal rate and regular rhythm.      Heart sounds: Normal heart sounds.   Pulmonary:      Effort: Pulmonary effort is normal. No respiratory distress.      Breath sounds: Normal breath sounds.      Comments: Tenderness right lateral chest wall.  No visible  crepitus or bruising.  Chest:      Chest wall: Tenderness present.   Abdominal:      General: There is no distension.      Palpations: Abdomen is soft.      Tenderness: There is no abdominal tenderness. There is no rebound.   Musculoskeletal:         General: No tenderness.      Cervical back: Normal range of motion.      Comments: Range of motion of the shoulder.  No tenderness with palpation of the humeral head or clavicle.  Mild tenderness in the lateral mid upper arm   Skin:     General: Skin is warm and dry.   Neurological:      General: No focal deficit present.      Mental Status: She is alert and oriented to person, place, and time.   Psychiatric:         Behavior: Behavior normal.         Thought Content: Thought content normal.           ED Course, Procedures, & Data      Procedures             Results for orders placed or performed during the hospital encounter of 01/25/24   XR Chest 2 Views     Status: None    Narrative    XR CHEST 2 VIEWS  1/25/2024 2:58 PM       INDICATION: fall, right lateral rib pain  COMPARISON: 8/17/2022       Impression    IMPRESSION: Stable bronchiectasis left lung base. No acute infiltrate.  No visible rib fractures. No pleural effusion or pneumothorax.    LEWIS CARDENAS MD         SYSTEM ID:  [id number]   Humerus XR, G/E 2 views, right     Status: None    Narrative    HUMERUS RIGHT TWO OR MORE VIEWS January 25, 2024 2:59 PM     INDICATION: Right arm pain after a fall.     COMPARISON: None.      Impression    IMPRESSION:  1.  Mild irregularity of the superior margin of the right humerus  greater tuberosity. This could represent enthesopathic change or less  likely a nondisplaced fracture. Consider dedicated right shoulder  radiographs in further evaluation.  2.  Otherwise, unremarkable exam.    LISANDRA BACON MD         SYSTEM ID:  [id number]     Medications   HYDROcodone-acetaminophen (NORCO) 5-325 MG per tablet 1 tablet (1 tablet Oral $Given 1/25/24 6302)              No  results found for any visits on 01/25/24.  Medications - No data to display  Labs Ordered and Resulted from Time of ED Arrival to Time of ED Departure - No data to display  No orders to display          Critical care was not performed.     Medical Decision Making  The patient's presentation was of moderate complexity (an acute complicated injury).    The patient's evaluation involved:  ordering and/or review of 2 test(s) in this encounter (CXR and humerus xray)    The patient's management necessitated moderate risk (prescription drug management including medications given in the ED).    Assessment & Plan    Patient presents to the ER due to a fall.  Patient is complaining of some right chest wall pain.  This is negative for x-ray.  Patient has no pneumothorax.  I do not suspect a humeral head fracture as I do not think dedicated shoulder films are indicated.  Patient's films were independently reviewed by myself.  Patient has full range of motion of her arm.  Patient received Norco and is feeling better.  Results were discussed with the patient.  Patient stable for discharge    Discussed plan for pain medications and X-rays, patient amenable to plan.       I have reviewed the nursing notes. I have reviewed the findings, diagnosis, plan and need for follow up with the patient.    New Prescriptions    No medications on file       Final diagnoses:   Fall from slip, trip, or stumble, initial encounter   Right-sided chest wall pain       Kiana García  [facility] EMERGENCY DEPARTMENT  1/25/2024     Kiana García MD  01/25/24 7303

## 2024-01-25 NOTE — DISCHARGE INSTRUCTIONS
Please make an appointment to follow up with Your Primary Care Provider in 3-5 days even if entirely better.    Your chest xray is normal with no signs of fracture.    Your arm xray is normal.     Take pain medications as needed.     Return to the ER if any other problems/concerns.

## 2024-01-25 NOTE — ED TRIAGE NOTES
Pt slipped and fell while at home, landing on wood floor. Pt c/o pain to R side, more ribs rather than hip. No meds for pain PTA.     Triage Assessment (Adult)       Row Name 01/25/24 1048          Triage Assessment    Airway WDL WDL        Respiratory WDL    Respiratory WDL WDL        Skin Circulation/Temperature WDL    Skin Circulation/Temperature WDL WDL        Cardiac WDL    Cardiac WDL X  hypertensive, took BP meds this AM        Peripheral/Neurovascular WDL    Peripheral Neurovascular WDL WDL        Cognitive/Neuro/Behavioral WDL    Cognitive/Neuro/Behavioral WDL WDL

## 2024-02-22 ENCOUNTER — MEDICAL CORRESPONDENCE (OUTPATIENT)
Dept: SCHEDULING | Facility: CLINIC | Age: 59
End: 2024-02-22
Payer: COMMERCIAL

## 2024-12-16 RX ORDER — ATORVASTATIN CALCIUM 20 MG/1
20 TABLET, FILM COATED ORAL DAILY
Qty: 30 TABLET | Refills: 11 | OUTPATIENT
Start: 2024-12-16

## (undated) RX ORDER — ATROPINE SULFATE 0.4 MG/ML
AMPUL (ML) INJECTION
Status: DISPENSED
Start: 2019-08-22

## (undated) RX ORDER — METOPROLOL TARTRATE 1 MG/ML
INJECTION, SOLUTION INTRAVENOUS
Status: DISPENSED
Start: 2019-08-22

## (undated) RX ORDER — DOBUTAMINE HYDROCHLORIDE 200 MG/100ML
INJECTION INTRAVENOUS
Status: DISPENSED
Start: 2019-08-22